# Patient Record
Sex: FEMALE | Race: BLACK OR AFRICAN AMERICAN | NOT HISPANIC OR LATINO | Employment: FULL TIME | URBAN - METROPOLITAN AREA
[De-identification: names, ages, dates, MRNs, and addresses within clinical notes are randomized per-mention and may not be internally consistent; named-entity substitution may affect disease eponyms.]

---

## 2023-10-06 PROBLEM — F12.90 MARIJUANA USE: Status: ACTIVE | Noted: 2023-10-06

## 2023-10-06 PROBLEM — O26.00 EXCESS WEIGHT GAIN IN PREGNANCY (HHS-HCC): Status: ACTIVE | Noted: 2023-10-06

## 2023-10-06 PROBLEM — O35.BXX0 ABNORMAL FETAL ECHOCARDIOGRAM AFFECTING ANTEPARTUM CARE OF MOTHER (HHS-HCC): Status: ACTIVE | Noted: 2023-10-06

## 2023-10-06 PROBLEM — O99.013 ANEMIA DURING PREGNANCY IN THIRD TRIMESTER (HHS-HCC): Status: ACTIVE | Noted: 2023-10-06

## 2023-10-06 PROBLEM — O09.30 LATE PRENATAL CARE (HHS-HCC): Status: ACTIVE | Noted: 2023-10-06

## 2023-10-06 PROBLEM — H52.10 ERROR, REFRACTIVE, MYOPIA: Status: ACTIVE | Noted: 2023-10-06

## 2023-10-06 PROBLEM — O09.899 SHORT INTERVAL BETWEEN PREGNANCIES AFFECTING PREGNANCY, ANTEPARTUM (HHS-HCC): Status: ACTIVE | Noted: 2023-10-06

## 2023-10-06 RX ORDER — IBUPROFEN 600 MG/1
TABLET ORAL
COMMUNITY
Start: 2022-09-29 | End: 2024-05-20

## 2023-10-06 RX ORDER — DOCUSATE SODIUM 100 MG/1
CAPSULE, LIQUID FILLED ORAL
COMMUNITY
Start: 2022-07-20 | End: 2023-10-09 | Stop reason: ALTCHOICE

## 2023-10-06 RX ORDER — FERROUS SULFATE 325(65) MG
1 TABLET ORAL DAILY
COMMUNITY
Start: 2022-07-20 | End: 2023-10-09 | Stop reason: SDUPTHER

## 2023-10-06 RX ORDER — PNV NO.95/FERROUS FUM/FOLIC AC 28MG-0.8MG
1 TABLET ORAL DAILY
COMMUNITY
Start: 2022-04-20 | End: 2024-02-27 | Stop reason: SDUPTHER

## 2023-10-09 ENCOUNTER — PROCEDURE VISIT (OUTPATIENT)
Dept: OBSTETRICS AND GYNECOLOGY | Facility: CLINIC | Age: 33
End: 2023-10-09
Payer: COMMERCIAL

## 2023-10-09 VITALS
HEART RATE: 62 BPM | SYSTOLIC BLOOD PRESSURE: 135 MMHG | BODY MASS INDEX: 30.04 KG/M2 | WEIGHT: 180.3 LBS | DIASTOLIC BLOOD PRESSURE: 81 MMHG | HEIGHT: 65 IN

## 2023-10-09 DIAGNOSIS — Z30.46 NEXPLANON REMOVAL: Primary | ICD-10-CM

## 2023-10-09 PROCEDURE — 11982 REMOVE DRUG IMPLANT DEVICE: CPT | Performed by: OBSTETRICS & GYNECOLOGY

## 2023-10-09 RX ORDER — PNV NO.95/FERROUS FUM/FOLIC AC 28MG-0.8MG
1 TABLET ORAL DAILY
Qty: 30 TABLET | Status: CANCELLED | OUTPATIENT
Start: 2023-10-09

## 2023-10-09 RX ORDER — FERROUS SULFATE 325(65) MG
1 TABLET ORAL DAILY
Qty: 30 TABLET | Refills: 3 | Status: SHIPPED | OUTPATIENT
Start: 2023-10-09 | End: 2024-02-27 | Stop reason: SDUPTHER

## 2023-10-09 ASSESSMENT — PAIN SCALES - GENERAL: PAINLEVEL: 0-NO PAIN

## 2023-10-10 PROBLEM — Z30.46 NEXPLANON REMOVAL: Status: ACTIVE | Noted: 2023-10-10

## 2023-10-10 NOTE — PROGRESS NOTES
Here for nexplanon removal due to unscheduled bleeding.  Discussed quick return to fertility, she expressed understanding.  Rx for Cordelia/emergency contraception sent to pharmacy.          --------------------------------------------    Lita Cedeno is a 32 y.o. female.  Requesting Nexplanon removal due to unscheduled bleeding.      Vitals:    10/09/23 1107   BP: 135/81   Pulse: 62     Gen:  NAD.  Appears comfortable.  Chest:  breathing easily.    Psych:  AAOx3.  Appropriate affect.      Insertion of Contraceptive Capsule    Date/Time: 10/10/2023 11:25 AM    Performed by: Alice Hawkins MD  Authorized by: Alice Hawkins MD    Consent:     Consent obtained:  Written    Consent given by:  Patient    Procedural risks discussed:  Bleeding, infection and damage to other organs    Patient questions answered: yes      Patient agrees, verbalizes understanding, and wants to proceed: yes      Educational handouts given: yes      Instructions and paperwork completed: yes    Pre-procedure:     Prepped with: povidone-iodine      Local anesthetic:  Lidocaine 1%  Procedure:     Procedure:  Removal    Small stab incision was made in arm: yes      Left/right:  Left    Site was closed with steri-strips and pressure bandage applied: yes    Comments:      Nexplanon removed intact.

## 2024-02-27 ENCOUNTER — INITIAL PRENATAL (OUTPATIENT)
Dept: OBSTETRICS AND GYNECOLOGY | Facility: CLINIC | Age: 34
End: 2024-02-27
Payer: COMMERCIAL

## 2024-02-27 ENCOUNTER — LAB (OUTPATIENT)
Dept: LAB | Facility: LAB | Age: 34
End: 2024-02-27
Payer: COMMERCIAL

## 2024-02-27 VITALS — WEIGHT: 169.5 LBS | DIASTOLIC BLOOD PRESSURE: 83 MMHG | BODY MASS INDEX: 28.21 KG/M2 | SYSTOLIC BLOOD PRESSURE: 130 MMHG

## 2024-02-27 DIAGNOSIS — O99.011 ANEMIA DURING PREGNANCY IN FIRST TRIMESTER (HHS-HCC): ICD-10-CM

## 2024-02-27 DIAGNOSIS — Z3A.09 9 WEEKS GESTATION OF PREGNANCY (HHS-HCC): ICD-10-CM

## 2024-02-27 DIAGNOSIS — Z86.79 HISTORY OF HEART MURMUR IN CHILDHOOD: ICD-10-CM

## 2024-02-27 DIAGNOSIS — O09.91 HIGH-RISK PREGNANCY IN FIRST TRIMESTER (HHS-HCC): ICD-10-CM

## 2024-02-27 DIAGNOSIS — O10.919 CHRONIC HYPERTENSION IN PREGNANCY (HHS-HCC): ICD-10-CM

## 2024-02-27 DIAGNOSIS — Z3A.09 9 WEEKS GESTATION OF PREGNANCY (HHS-HCC): Primary | ICD-10-CM

## 2024-02-27 DIAGNOSIS — O09.41 HIGH RISK MULTIGRAVIDA IN FIRST TRIMESTER (HHS-HCC): ICD-10-CM

## 2024-02-27 PROBLEM — O09.899 SHORT INTERVAL BETWEEN PREGNANCIES AFFECTING PREGNANCY, ANTEPARTUM (HHS-HCC): Status: RESOLVED | Noted: 2023-10-06 | Resolved: 2024-02-27

## 2024-02-27 PROBLEM — O99.013 ANEMIA DURING PREGNANCY IN THIRD TRIMESTER (HHS-HCC): Status: RESOLVED | Noted: 2023-10-06 | Resolved: 2024-02-27

## 2024-02-27 PROBLEM — Z34.91 ENCOUNTER FOR SUPERVISION OF NORMAL PREGNANCY IN FIRST TRIMESTER (HHS-HCC): Status: ACTIVE | Noted: 2024-02-27

## 2024-02-27 PROBLEM — O09.30 LATE PRENATAL CARE (HHS-HCC): Status: RESOLVED | Noted: 2023-10-06 | Resolved: 2024-02-27

## 2024-02-27 PROBLEM — H52.10 ERROR, REFRACTIVE, MYOPIA: Status: RESOLVED | Noted: 2023-10-06 | Resolved: 2024-02-27

## 2024-02-27 PROBLEM — O26.00 EXCESS WEIGHT GAIN IN PREGNANCY (HHS-HCC): Status: RESOLVED | Noted: 2023-10-06 | Resolved: 2024-02-27

## 2024-02-27 PROBLEM — F12.90 MARIJUANA USE: Status: RESOLVED | Noted: 2023-10-06 | Resolved: 2024-02-27

## 2024-02-27 PROBLEM — O35.BXX0 ABNORMAL FETAL ECHOCARDIOGRAM AFFECTING ANTEPARTUM CARE OF MOTHER (HHS-HCC): Status: RESOLVED | Noted: 2023-10-06 | Resolved: 2024-02-27

## 2024-02-27 PROBLEM — Z30.46 NEXPLANON REMOVAL: Status: RESOLVED | Noted: 2023-10-10 | Resolved: 2024-02-27

## 2024-02-27 LAB
ABO GROUP (TYPE) IN BLOOD: NORMAL
ALBUMIN SERPL BCP-MCNC: 4.3 G/DL (ref 3.4–5)
ALP SERPL-CCNC: 23 U/L (ref 33–110)
ALT SERPL W P-5'-P-CCNC: 12 U/L (ref 7–45)
ANION GAP SERPL CALC-SCNC: 12 MMOL/L (ref 10–20)
ANTIBODY SCREEN: NORMAL
AST SERPL W P-5'-P-CCNC: 11 U/L (ref 9–39)
BILIRUB SERPL-MCNC: 0.3 MG/DL (ref 0–1.2)
BUN SERPL-MCNC: 10 MG/DL (ref 6–23)
CALCIUM SERPL-MCNC: 9.4 MG/DL (ref 8.6–10.6)
CHLORIDE SERPL-SCNC: 106 MMOL/L (ref 98–107)
CO2 SERPL-SCNC: 24 MMOL/L (ref 21–32)
CREAT SERPL-MCNC: 0.53 MG/DL (ref 0.5–1.05)
EGFRCR SERPLBLD CKD-EPI 2021: >90 ML/MIN/1.73M*2
ERYTHROCYTE [DISTWIDTH] IN BLOOD BY AUTOMATED COUNT: 12.1 % (ref 11.5–14.5)
GLUCOSE SERPL-MCNC: 83 MG/DL (ref 74–99)
HCT VFR BLD AUTO: 33.8 % (ref 36–46)
HGB BLD-MCNC: 11.6 G/DL (ref 12–16)
HIV 1+2 AB+HIV1 P24 AG SERPL QL IA: NONREACTIVE
MCH RBC QN AUTO: 30.8 PG (ref 26–34)
MCHC RBC AUTO-ENTMCNC: 34.3 G/DL (ref 32–36)
MCV RBC AUTO: 90 FL (ref 80–100)
NRBC BLD-RTO: 0 /100 WBCS (ref 0–0)
PLATELET # BLD AUTO: 240 X10*3/UL (ref 150–450)
POTASSIUM SERPL-SCNC: 3.8 MMOL/L (ref 3.5–5.3)
PROT SERPL-MCNC: 6.9 G/DL (ref 6.4–8.2)
RBC # BLD AUTO: 3.77 X10*6/UL (ref 4–5.2)
RH FACTOR (ANTIGEN D): NORMAL
SODIUM SERPL-SCNC: 138 MMOL/L (ref 136–145)
WBC # BLD AUTO: 4 X10*3/UL (ref 4.4–11.3)

## 2024-02-27 PROCEDURE — 86900 BLOOD TYPING SEROLOGIC ABO: CPT

## 2024-02-27 PROCEDURE — 87340 HEPATITIS B SURFACE AG IA: CPT

## 2024-02-27 PROCEDURE — 80053 COMPREHEN METABOLIC PANEL: CPT

## 2024-02-27 PROCEDURE — 81220 CFTR GENE COM VARIANTS: CPT

## 2024-02-27 PROCEDURE — 99215 OFFICE O/P EST HI 40 MIN: CPT | Performed by: STUDENT IN AN ORGANIZED HEALTH CARE EDUCATION/TRAINING PROGRAM

## 2024-02-27 PROCEDURE — 99215 OFFICE O/P EST HI 40 MIN: CPT | Mod: GC | Performed by: STUDENT IN AN ORGANIZED HEALTH CARE EDUCATION/TRAINING PROGRAM

## 2024-02-27 PROCEDURE — 87389 HIV-1 AG W/HIV-1&-2 AB AG IA: CPT

## 2024-02-27 PROCEDURE — 86850 RBC ANTIBODY SCREEN: CPT

## 2024-02-27 PROCEDURE — 85027 COMPLETE CBC AUTOMATED: CPT

## 2024-02-27 PROCEDURE — 86317 IMMUNOASSAY INFECTIOUS AGENT: CPT

## 2024-02-27 PROCEDURE — 87086 URINE CULTURE/COLONY COUNT: CPT

## 2024-02-27 PROCEDURE — 81329 SMN1 GENE DOS/DELETION ALYS: CPT

## 2024-02-27 PROCEDURE — 87800 DETECT AGNT MULT DNA DIREC: CPT

## 2024-02-27 PROCEDURE — 84156 ASSAY OF PROTEIN URINE: CPT

## 2024-02-27 PROCEDURE — 82570 ASSAY OF URINE CREATININE: CPT

## 2024-02-27 PROCEDURE — G0452 MOLECULAR PATHOLOGY INTERPR: HCPCS | Performed by: OBSTETRICS & GYNECOLOGY

## 2024-02-27 PROCEDURE — 86803 HEPATITIS C AB TEST: CPT

## 2024-02-27 PROCEDURE — 86901 BLOOD TYPING SEROLOGIC RH(D): CPT

## 2024-02-27 PROCEDURE — 86780 TREPONEMA PALLIDUM: CPT

## 2024-02-27 PROCEDURE — 36415 COLL VENOUS BLD VENIPUNCTURE: CPT

## 2024-02-27 RX ORDER — ASPIRIN 81 MG/1
81 TABLET ORAL DAILY
Qty: 30 TABLET | Refills: 11 | Status: SHIPPED | OUTPATIENT
Start: 2024-02-27 | End: 2025-02-26

## 2024-02-27 RX ORDER — FERROUS SULFATE 325(65) MG
1 TABLET ORAL DAILY
Qty: 30 TABLET | Refills: 3 | Status: SHIPPED | OUTPATIENT
Start: 2024-02-27

## 2024-02-27 RX ORDER — ACETAMINOPHEN 500 MG
1 TABLET ORAL DAILY
Qty: 1 KIT | Refills: 0 | Status: SHIPPED | OUTPATIENT
Start: 2024-02-27

## 2024-02-27 RX ORDER — PNV NO.95/FERROUS FUM/FOLIC AC 28MG-0.8MG
1 TABLET ORAL DAILY
Qty: 30 TABLET | Refills: 11 | Status: SHIPPED | OUTPATIENT
Start: 2024-02-27

## 2024-02-27 ASSESSMENT — ENCOUNTER SYMPTOMS
RESPIRATORY NEGATIVE: 0
CONSTITUTIONAL NEGATIVE: 0
CARDIOVASCULAR NEGATIVE: 0
NEUROLOGICAL NEGATIVE: 0
HEMATOLOGIC/LYMPHATIC NEGATIVE: 0
ALLERGIC/IMMUNOLOGIC NEGATIVE: 0
GASTROINTESTINAL NEGATIVE: 0
EYES NEGATIVE: 0
PSYCHIATRIC NEGATIVE: 0
MUSCULOSKELETAL NEGATIVE: 0
ENDOCRINE NEGATIVE: 0

## 2024-02-27 ASSESSMENT — EDINBURGH POSTNATAL DEPRESSION SCALE (EPDS)
I HAVE BEEN SO UNHAPPY THAT I HAVE BEEN CRYING: ONLY OCCASIONALLY
TOTAL SCORE: 9
I HAVE BLAMED MYSELF UNNECESSARILY WHEN THINGS WENT WRONG: NOT VERY OFTEN
THINGS HAVE BEEN GETTING ON TOP OF ME: NO, MOST OF THE TIME I HAVE COPED QUITE WELL
I HAVE BEEN ABLE TO LAUGH AND SEE THE FUNNY SIDE OF THINGS: AS MUCH AS I ALWAYS COULD
I HAVE FELT SCARED OR PANICKY FOR NO GOOD REASON: NO, NOT MUCH
I HAVE BEEN SO UNHAPPY THAT I HAVE HAD DIFFICULTY SLEEPING: NOT VERY OFTEN
I HAVE LOOKED FORWARD WITH ENJOYMENT TO THINGS: RATHER LESS THAN I USED TO
I HAVE BEEN ANXIOUS OR WORRIED FOR NO GOOD REASON: YES, SOMETIMES
I HAVE FELT SAD OR MISERABLE: NOT VERY OFTEN
THE THOUGHT OF HARMING MYSELF HAS OCCURRED TO ME: NEVER

## 2024-02-27 NOTE — PROGRESS NOTES
Subjective   Patient ID [id number]   Lita Cedeno is a 33 y.o.  at 9w6d with a working estimated date of delivery of 2024, by Last Menstrual Period who presents for an initial prenatal visit. This pregnancy was unexpected, patient desires to continue it. Endorsing lightheadedness, feels similar to previous anemia symptoms, not currently on PO iron. Drinking 3 bottles of water per day.     Her pregnancy is complicated by:  - Maternal history of heart murmur in childhood, seen by cardiology in 2019, per chart review of previous OB notes, no further work up needed   - Anemia  - Chronic hypertension, not on medication     OB History    Para Term  AB Living   5 4 4   0 4   SAB IAB Ectopic Multiple Live Births     0     4      # Outcome Date GA Lbr Reginald/2nd Weight Sex Delivery Anes PTL Lv   5 Current            4 Term      Vag-Spont  N PEGGY   3 Term 2019 39w4d    Vag-Spont  N PEGGY   2 Term 07/15/17 39w4d  2.92 kg M Vag-Spont  N PEGGY   1 Term 11/03/15   3.799 kg M Vag-Spont   PEGGY     [place]  Depression Scale Total: 9    Objective   Physical Exam  Weight: 76.9 kg (169 lb 8 oz)  Expected Total Weight Gain: Could not be calculated   Pregravid BMI: Could not be calculated  BP: 130/83      Physical Exam  Constitutional:       Appearance: Normal appearance.   HENT:      Head: Normocephalic and atraumatic.      Nose: Nose normal.   Eyes:      Extraocular Movements: Extraocular movements intact.   Cardiovascular:      Rate and Rhythm: Normal rate.   Pulmonary:      Effort: Pulmonary effort is normal.   Musculoskeletal:         General: Normal range of motion.      Cervical back: Normal range of motion.   Neurological:      General: No focal deficit present.      Mental Status: She is alert.   Skin:     General: Skin is warm and dry.   Psychiatric:         Behavior: Behavior normal.       Assessment/Plan     Problem List Items Addressed This Visit       9 weeks gestation of pregnancy -  Primary    Relevant Medications    prenatal vitamin, iron-folic, (Prenatal) 27 mg iron-800 mcg folic acid tablet    Other Relevant Orders    US MAC OB imaging order    CBC Anemia Panel With Reflex,Pregnancy    Type And Screen    Hepatitis B surface antigen    Hepatitis C antibody    Rubella Antibody, IgG    Syphilis Screen with Reflex    HIV 1/2 Antigen/Antibody Screen with Reflex to Confirmation    Urine Culture    C. Trachomatis / N. Gonorrhoeae, Amplified Detection    CBC    Comprehensive Metabolic Panel    Protein, Urine Random    Myriad Prequel Prenatal Screen    SMA Carrier Screening    Cystic Fibrosis Carrier Screening    Follow Up In Obstetrics    Anemia during pregnancy in first trimester    Overview     Prescribed PO iron         Relevant Medications    ferrous sulfate, 325 mg ferrous sulfate, tablet    Chronic hypertension in pregnancy    Overview     - BP at first prenatal visit 130/83  - Not on meds   - Baseline HELLP labs ordered   - Started on bASA ppx          Relevant Medications    aspirin 81 mg EC tablet    blood pressure monitor (Blood Pressure Kit) kit    Encounter for supervision of normal pregnancy in first trimester    Overview     Dating:   [x] Initial BMI: 28  [x] Prenatal Labs: ordered   [x] Genetic Screening: ordered Myriad, CF and SMA screening 2/27  [x] Baby ASA: ordered  [] Anatomy US:  [] 1hr GCT at 24-28wks:  [] Tdap (27-36wks):  [] Flu Shot:  [] COVID vaccine:   [] Rhogam (if Rh neg):   [] GBS at 36 wks:  [] Breastfeeding  [] Postpartum Birth control method:   [] 39 weeks discussion of IOL vs. Expectant management:  [] Mode of delivery:           History of heart murmur in childhood    Overview     Per documentation from 2019 pregnancy, seen by cardiology and required no further follow up   Can consider fetal echo          Other Visit Diagnoses       High-risk pregnancy in first trimester              - Prenatal labs, genetic screening and CF and SMA carrier screening ordered as  above  - Daily prenatal vitamins prescribed  - Menses irregular, dating US ordered     Follow up in 4 weeks for return OB visit    Seen and discussed with Dr. Susy Mckeon MD

## 2024-02-28 LAB
BACTERIA UR CULT: NO GROWTH
C TRACH RRNA SPEC QL NAA+PROBE: NEGATIVE
CREAT UR-MCNC: 200.3 MG/DL (ref 20–320)
HBV SURFACE AG SERPL QL IA: NONREACTIVE
HCV AB SER QL: NONREACTIVE
N GONORRHOEA DNA SPEC QL PROBE+SIG AMP: NEGATIVE
PROT UR-ACNC: 30 MG/DL (ref 5–24)
PROT/CREAT UR: 0.15 MG/MG CREAT (ref 0–0.17)
REFLEX ADDED, ANEMIA PANEL: NORMAL
RUBV IGG SERPL IA-ACNC: 4.2 IA
RUBV IGG SERPL QL IA: POSITIVE
TREPONEMA PALLIDUM IGG+IGM AB [PRESENCE] IN SERUM OR PLASMA BY IMMUNOASSAY: NONREACTIVE

## 2024-03-08 LAB
ELECTRONICALLY SIGNED BY: NORMAL
SMA RESULT: NORMAL

## 2024-03-15 LAB
CFTR MUT ANL BLD/T: NORMAL
ELECTRONICALLY SIGNED BY: NORMAL

## 2024-04-08 ENCOUNTER — PHARMACY VISIT (OUTPATIENT)
Dept: PHARMACY | Facility: CLINIC | Age: 34
End: 2024-04-08
Payer: MEDICAID

## 2024-04-08 ENCOUNTER — LAB (OUTPATIENT)
Dept: LAB | Facility: LAB | Age: 34
End: 2024-04-08
Payer: COMMERCIAL

## 2024-04-08 ENCOUNTER — ROUTINE PRENATAL (OUTPATIENT)
Dept: OBSTETRICS AND GYNECOLOGY | Facility: CLINIC | Age: 34
End: 2024-04-08
Payer: COMMERCIAL

## 2024-04-08 VITALS — BODY MASS INDEX: 31.18 KG/M2 | DIASTOLIC BLOOD PRESSURE: 70 MMHG | SYSTOLIC BLOOD PRESSURE: 114 MMHG | WEIGHT: 187.4 LBS

## 2024-04-08 DIAGNOSIS — R51.9 NONINTRACTABLE HEADACHE, UNSPECIFIED CHRONICITY PATTERN, UNSPECIFIED HEADACHE TYPE: ICD-10-CM

## 2024-04-08 DIAGNOSIS — Z34.02 ENCOUNTER FOR SUPERVISION OF NORMAL FIRST PREGNANCY IN SECOND TRIMESTER (HHS-HCC): Primary | ICD-10-CM

## 2024-04-08 DIAGNOSIS — O10.919 CHRONIC HYPERTENSION AFFECTING PREGNANCY (HHS-HCC): ICD-10-CM

## 2024-04-08 DIAGNOSIS — R35.0 URINARY FREQUENCY: ICD-10-CM

## 2024-04-08 DIAGNOSIS — R42 DIZZINESS: ICD-10-CM

## 2024-04-08 DIAGNOSIS — Z34.82 PRENATAL CARE, SUBSEQUENT PREGNANCY, SECOND TRIMESTER (HHS-HCC): ICD-10-CM

## 2024-04-08 PROBLEM — Z34.92 ENCOUNTER FOR SUPERVISION OF NORMAL PREGNANCY IN SECOND TRIMESTER (HHS-HCC): Status: ACTIVE | Noted: 2024-02-27

## 2024-04-08 PROBLEM — Z3A.09 9 WEEKS GESTATION OF PREGNANCY (HHS-HCC): Status: RESOLVED | Noted: 2024-02-27 | Resolved: 2024-04-08

## 2024-04-08 LAB
ERYTHROCYTE [DISTWIDTH] IN BLOOD BY AUTOMATED COUNT: 12.7 % (ref 11.5–14.5)
FERRITIN SERPL-MCNC: 46 NG/ML
FOLATE SERPL-MCNC: >24 NG/ML
HCT VFR BLD AUTO: 31.2 % (ref 36–46)
HGB BLD-MCNC: 10.4 G/DL (ref 12–16)
IRON SATN MFR SERPL: 42 %
IRON SERPL-MCNC: 120 UG/DL
MCH RBC QN AUTO: 31.2 PG (ref 26–34)
MCHC RBC AUTO-ENTMCNC: 33.3 G/DL (ref 32–36)
MCV RBC AUTO: 94 FL (ref 80–100)
NRBC BLD-RTO: 0 /100 WBCS (ref 0–0)
PLATELET # BLD AUTO: 241 X10*3/UL (ref 150–450)
RBC # BLD AUTO: 3.33 X10*6/UL (ref 4–5.2)
REFLEX ADDED, ANEMIA PANEL: NORMAL
TIBC SERPL-MCNC: 285 UG/DL
UIBC SERPL-MCNC: 165 UG/DL
VIT B12 SERPL-MCNC: 469 PG/ML
WBC # BLD AUTO: 5.3 X10*3/UL (ref 4.4–11.3)

## 2024-04-08 PROCEDURE — 99214 OFFICE O/P EST MOD 30 MIN: CPT | Mod: GC,TH | Performed by: STUDENT IN AN ORGANIZED HEALTH CARE EDUCATION/TRAINING PROGRAM

## 2024-04-08 PROCEDURE — 99214 OFFICE O/P EST MOD 30 MIN: CPT | Performed by: STUDENT IN AN ORGANIZED HEALTH CARE EDUCATION/TRAINING PROGRAM

## 2024-04-08 PROCEDURE — 82607 VITAMIN B-12: CPT

## 2024-04-08 PROCEDURE — RXMED WILLOW AMBULATORY MEDICATION CHARGE

## 2024-04-08 PROCEDURE — 83550 IRON BINDING TEST: CPT

## 2024-04-08 PROCEDURE — 85027 COMPLETE CBC AUTOMATED: CPT

## 2024-04-08 PROCEDURE — 82728 ASSAY OF FERRITIN: CPT

## 2024-04-08 PROCEDURE — 36415 COLL VENOUS BLD VENIPUNCTURE: CPT

## 2024-04-08 PROCEDURE — 82746 ASSAY OF FOLIC ACID SERUM: CPT

## 2024-04-08 PROCEDURE — 87086 URINE CULTURE/COLONY COUNT: CPT | Performed by: STUDENT IN AN ORGANIZED HEALTH CARE EDUCATION/TRAINING PROGRAM

## 2024-04-08 RX ORDER — ACETAMINOPHEN 500 MG
1000 TABLET ORAL EVERY 6 HOURS PRN
Qty: 30 TABLET | Refills: 0 | Status: SHIPPED | OUTPATIENT
Start: 2024-04-08 | End: 2024-04-08 | Stop reason: SDUPTHER

## 2024-04-08 RX ORDER — ACETAMINOPHEN 500 MG
1 TABLET ORAL DAILY
Qty: 1 EACH | Refills: 0 | Status: SHIPPED | OUTPATIENT
Start: 2024-04-08

## 2024-04-08 RX ORDER — ACETAMINOPHEN 500 MG
1000 TABLET ORAL EVERY 6 HOURS PRN
Qty: 30 TABLET | Refills: 0 | Status: SHIPPED | OUTPATIENT
Start: 2024-04-08 | End: 2024-04-18

## 2024-04-08 RX ORDER — ACETAMINOPHEN 500 MG
1 TABLET ORAL DAILY
Qty: 1 KIT | Refills: 0 | Status: SHIPPED | OUTPATIENT
Start: 2024-04-08 | End: 2024-04-08

## 2024-04-08 ASSESSMENT — ENCOUNTER SYMPTOMS
HEMATOLOGIC/LYMPHATIC NEGATIVE: 0
ALLERGIC/IMMUNOLOGIC NEGATIVE: 0
GASTROINTESTINAL NEGATIVE: 0
RESPIRATORY NEGATIVE: 0
EYES NEGATIVE: 0
CONSTITUTIONAL NEGATIVE: 0
PSYCHIATRIC NEGATIVE: 0
ENDOCRINE NEGATIVE: 0
MUSCULOSKELETAL NEGATIVE: 0
NEUROLOGICAL NEGATIVE: 0
CARDIOVASCULAR NEGATIVE: 0

## 2024-04-08 NOTE — PROGRESS NOTES
Ob Follow-up  2024      SUBJECTIVE    HPI: Lita Cedeno is a 33 y.o.  at 15w5d here for RPNV.  She has no contractions, bleeding, or LOF. Reports normal fetal movement.     Patient reports some episodes of light headedness/dizziness with headaches. No aura, vision changes, hearing changes, n/v. Happens about twice a week. Taking iron and prenatal vitamin daily.     Also endorses increased urinary frequency. Denies dysuria, foul smelling urine, color change of urine.     Otherwise feels well. Thinks that some of the symptoms she is having occurred later in her other pregnancies. These include back pain, leg pain, and general abdominal discomfort.     OBJECTIVE  Visit Vitals  /70 Comment: Pluse-71   Wt 85 kg (187 lb 6.4 oz)   LMP 2023 (Within Weeks)   BMI 31.18 kg/m²   OB Status Pregnant   Smoking Status Never   BSA 1.97 m²      FHT: visually normal on BSUS     GENERAL: well developed, well nourished female in no acute distress  HEENT: clear sclera  NECK: supple  LUNGS: breathing comfortably on room air  HEART: warm and well-perfused  SKIN: no rashes or lesions  NEUROLOGICAL: grossly intact bilaterally  PSYCHOLOGICAL: appropriate affect     ASSESSMENT & PLAN  Lita Cedeno is a 33 y.o.  at 15w5d here for the following concerns we addressed today:    Problem List Items Addressed This Visit       Chronic hypertension affecting pregnancy    Overview     - BP at first prenatal visit 130/83  - Not on meds   - Baseline HELLP labs  WNL   - Started on daily bASA ppx          Relevant Medications    blood pressure test kit-large kit    Dizziness    Overview     - Symptoms may be due to dehydration in pregnancy   - Encouraged PO hydration and compression stockings ordered   - Given history of murmur (none on  on exam) and episodes of lightheadedness, discussed echo if symptoms do not resolve with supportive measures as above   - CBC ordered to evaluate for anemia          Relevant  Orders    CBC Anemia Panel With Reflex, Pregnancy (Completed)    Compression Stockings 15-20 mmHg    Transthoracic Echo (TTE) Complete    Encounter for supervision of normal pregnancy in second trimester - Primary    Overview     Dating:   [x] Initial BMI: 28  [x] Prenatal Labs: WNL   [] Genetic Screening: ordered Myriad, CF and SMA screening 2/27 and WNL;  prequel redrawn and pending 4/8   [x] Baby ASA: ordered  [] Anatomy US:  [] 1hr GCT at 24-28wks:  [] Tdap (27-36wks):  [] Flu Shot:  [] COVID vaccine:   [x] Rhogam (if Rh neg): Rh positive, not indicated   [] GBS at 36 wks:  [] Breastfeeding  [] Postpartum birth control method: considering tubal   [] 39 weeks discussion of IOL vs. Expectant management:  [] Mode of delivery:           Relevant Orders    US MAC OB imaging order    Nonintractable headache    Relevant Medications    acetaminophen (Tylenol Extra Strength) 500 mg tablet    Urinary frequency    Relevant Orders    Urine Culture     Jayme Cronin, MS3    RTC in 4 weeks    PGY4 addendum: Agree with MS3 note as above, edits made within text.     Seen and discussed with Dr. Mary Mckeon MD

## 2024-04-08 NOTE — PROGRESS NOTES
I saw and evaluated the patient. I personally obtained the key and critical portions of the history and physical exam or was physically present for key and critical portions performed by the resident/fellow. I reviewed the resident/fellow's documentation and discussed the patient with the resident/fellow. I agree with the resident/fellow's medical decision making as documented in the note.    Maureen Hernandez MD

## 2024-04-09 LAB — BACTERIA UR CULT: NORMAL

## 2024-04-10 ENCOUNTER — HOSPITAL ENCOUNTER (OUTPATIENT)
Dept: RADIOLOGY | Facility: CLINIC | Age: 34
Discharge: HOME | End: 2024-04-10
Payer: COMMERCIAL

## 2024-04-10 DIAGNOSIS — Z34.02 ENCOUNTER FOR SUPERVISION OF NORMAL FIRST PREGNANCY IN SECOND TRIMESTER (HHS-HCC): ICD-10-CM

## 2024-04-10 PROCEDURE — 76811 OB US DETAILED SNGL FETUS: CPT

## 2024-04-10 PROCEDURE — 76805 OB US >/= 14 WKS SNGL FETUS: CPT | Performed by: OBSTETRICS & GYNECOLOGY

## 2024-04-16 ENCOUNTER — DOCUMENTATION (OUTPATIENT)
Dept: OBSTETRICS AND GYNECOLOGY | Facility: CLINIC | Age: 34
End: 2024-04-16
Payer: COMMERCIAL

## 2024-04-17 LAB — SCAN RESULT: NORMAL

## 2024-05-06 ENCOUNTER — APPOINTMENT (OUTPATIENT)
Dept: OBSTETRICS AND GYNECOLOGY | Facility: CLINIC | Age: 34
End: 2024-05-06
Payer: COMMERCIAL

## 2024-05-06 ENCOUNTER — APPOINTMENT (OUTPATIENT)
Dept: RADIOLOGY | Facility: CLINIC | Age: 34
End: 2024-05-06
Payer: COMMERCIAL

## 2024-05-20 ENCOUNTER — HOSPITAL ENCOUNTER (OUTPATIENT)
Dept: RADIOLOGY | Facility: CLINIC | Age: 34
Discharge: HOME | End: 2024-05-20
Payer: COMMERCIAL

## 2024-05-20 ENCOUNTER — ROUTINE PRENATAL (OUTPATIENT)
Dept: OBSTETRICS AND GYNECOLOGY | Facility: CLINIC | Age: 34
End: 2024-05-20
Payer: COMMERCIAL

## 2024-05-20 VITALS — SYSTOLIC BLOOD PRESSURE: 120 MMHG | DIASTOLIC BLOOD PRESSURE: 69 MMHG | BODY MASS INDEX: 33.22 KG/M2 | WEIGHT: 199.6 LBS

## 2024-05-20 DIAGNOSIS — Z34.02 ENCOUNTER FOR SUPERVISION OF NORMAL FIRST PREGNANCY IN SECOND TRIMESTER (HHS-HCC): ICD-10-CM

## 2024-05-20 DIAGNOSIS — R42 DIZZINESS: ICD-10-CM

## 2024-05-20 DIAGNOSIS — O10.912 CHRONIC HYPERTENSION COMPLICATING OR REASON FOR CARE DURING PREGNANCY, SECOND TRIMESTER (HHS-HCC): ICD-10-CM

## 2024-05-20 DIAGNOSIS — O99.011 ANEMIA DURING PREGNANCY IN FIRST TRIMESTER (HHS-HCC): ICD-10-CM

## 2024-05-20 DIAGNOSIS — Z03.74 FETAL GROWTH PROBLEM SUSPECTED BUT NOT FOUND: ICD-10-CM

## 2024-05-20 DIAGNOSIS — Z34.82 ENCOUNTER FOR SUPERVISION OF OTHER NORMAL PREGNANCY IN SECOND TRIMESTER (HHS-HCC): ICD-10-CM

## 2024-05-20 DIAGNOSIS — O10.919 CHRONIC HYPERTENSION AFFECTING PREGNANCY (HHS-HCC): ICD-10-CM

## 2024-05-20 DIAGNOSIS — Z86.79 HISTORY OF HEART MURMUR IN CHILDHOOD: Primary | ICD-10-CM

## 2024-05-20 PROCEDURE — 76816 OB US FOLLOW-UP PER FETUS: CPT | Performed by: OBSTETRICS & GYNECOLOGY

## 2024-05-20 PROCEDURE — 99213 OFFICE O/P EST LOW 20 MIN: CPT

## 2024-05-20 PROCEDURE — 99213 OFFICE O/P EST LOW 20 MIN: CPT | Mod: GC,TH

## 2024-05-20 PROCEDURE — 76816 OB US FOLLOW-UP PER FETUS: CPT

## 2024-05-20 NOTE — PROGRESS NOTES
Subjective   Patient ID [id number]   Lita Cedeno is a 33 y.o.  at 25w1d by 19 week scan presenting or OB fuv. She denies vaginal bleeding, leakage of fluid, decreased fetal movements, or contractions. Has still been experiencing dizziness. Has not received compression stockings yet.     Objective   Physical Exam  Weight: 90.5 kg (199 lb 9.6 oz)  BP: 120/69 (92)  Fundal height: 25 cm   Fetal heart rate: 140 bpm    Assessment/Plan   Problem List Items Addressed This Visit             ICD-10-CM    Encounter for supervision of normal pregnancy in second trimester ([facility]) Z34.92    Chronic hypertension affecting pregnancy ([facility]) O10.919     - Currently not taking blood pressure. Encouraged to start taking blood pressure at least once a week  - Repeat growth ultrasound scheduled today given late dating ultrasound. To continue serial growth scans          Anemia during pregnancy in first trimester ([facility]) O99.011     - Taking PO iron maybe two times a week, encouraged to start taking at least every other day   - For repeat CBC at next visit with other second trimester labs          Relevant Orders    CBC    History of heart murmur in childhood - Primary Z86.79     - TTE ordered, order req provided to patient again today          Dizziness R42     - RN staff notified to re order compression stockings  - Encouraged elevation of lower extremities and to avoid standing for long periods of time without resting            RTC in 4 weeks   D/w Dr. Mary Aldana MD, PGY-2

## 2024-05-20 NOTE — ASSESSMENT & PLAN NOTE
- RN staff notified to re order compression stockings  - Encouraged elevation of lower extremities and to avoid standing for long periods of time without resting

## 2024-05-20 NOTE — ASSESSMENT & PLAN NOTE
- Taking PO iron maybe two times a week, encouraged to start taking at least every other day   - For repeat CBC at next visit with other second trimester labs

## 2024-05-20 NOTE — ASSESSMENT & PLAN NOTE
- Currently not taking blood pressure. Encouraged to start taking blood pressure at least once a week  - Repeat growth ultrasound scheduled today given late dating ultrasound. To continue serial growth scans

## 2024-05-26 NOTE — PROGRESS NOTES
I reviewed the resident/fellow's documentation and discussed the patient with the resident/fellow. I agree with the resident/fellow's medical decision making as documented in the note.     Maureen Hernandez MD

## 2024-06-21 ENCOUNTER — LAB (OUTPATIENT)
Dept: LAB | Facility: LAB | Age: 34
End: 2024-06-21
Payer: COMMERCIAL

## 2024-06-21 ENCOUNTER — ROUTINE PRENATAL (OUTPATIENT)
Dept: OBSTETRICS AND GYNECOLOGY | Facility: CLINIC | Age: 34
End: 2024-06-21
Payer: COMMERCIAL

## 2024-06-21 VITALS — WEIGHT: 208.8 LBS | SYSTOLIC BLOOD PRESSURE: 105 MMHG | DIASTOLIC BLOOD PRESSURE: 60 MMHG | BODY MASS INDEX: 34.75 KG/M2

## 2024-06-21 DIAGNOSIS — O10.919 CHRONIC HYPERTENSION AFFECTING PREGNANCY (HHS-HCC): ICD-10-CM

## 2024-06-21 DIAGNOSIS — O99.011 ANEMIA DURING PREGNANCY IN FIRST TRIMESTER (HHS-HCC): ICD-10-CM

## 2024-06-21 DIAGNOSIS — R42 DIZZINESS: ICD-10-CM

## 2024-06-21 DIAGNOSIS — Z28.21 TETANUS, DIPHTHERIA, AND ACELLULAR PERTUSSIS (TDAP) VACCINATION DECLINED: ICD-10-CM

## 2024-06-21 DIAGNOSIS — Z3A.29 29 WEEKS GESTATION OF PREGNANCY (HHS-HCC): Primary | ICD-10-CM

## 2024-06-21 DIAGNOSIS — Z34.82 ENCOUNTER FOR SUPERVISION OF OTHER NORMAL PREGNANCY IN SECOND TRIMESTER (HHS-HCC): ICD-10-CM

## 2024-06-21 DIAGNOSIS — Z30.2 REQUEST FOR STERILIZATION: ICD-10-CM

## 2024-06-21 LAB
ERYTHROCYTE [DISTWIDTH] IN BLOOD BY AUTOMATED COUNT: 12.4 % (ref 11.5–14.5)
FERRITIN SERPL-MCNC: 25 NG/ML
FOLATE SERPL-MCNC: >24 NG/ML
GLUCOSE 1H P 50 G GLC PO SERPL-MCNC: 110 MG/DL
HCT VFR BLD AUTO: 30.1 % (ref 36–46)
HGB BLD-MCNC: 10.5 G/DL (ref 12–16)
HGB RETIC QN: 36 PG (ref 28–38)
IMMATURE RETIC FRACTION: 14.7 %
IRON SATN MFR SERPL: 36 %
IRON SERPL-MCNC: 114 UG/DL
MCH RBC QN AUTO: 32.6 PG (ref 26–34)
MCHC RBC AUTO-ENTMCNC: 34.9 G/DL (ref 32–36)
MCV RBC AUTO: 94 FL (ref 80–100)
NRBC BLD-RTO: 0 /100 WBCS (ref 0–0)
PLATELET # BLD AUTO: 207 X10*3/UL (ref 150–450)
RBC # BLD AUTO: 3.22 X10*6/UL (ref 4–5.2)
REFLEX ADDED, ANEMIA PANEL: NORMAL
RETICS #: 0.07 X10*6/UL (ref 0.02–0.08)
RETICS/RBC NFR AUTO: 2.2 % (ref 0.5–2)
TIBC SERPL-MCNC: 321 UG/DL
TREPONEMA PALLIDUM IGG+IGM AB [PRESENCE] IN SERUM OR PLASMA BY IMMUNOASSAY: NONREACTIVE
UIBC SERPL-MCNC: 207 UG/DL
VIT B12 SERPL-MCNC: 391 PG/ML
WBC # BLD AUTO: 6.1 X10*3/UL (ref 4.4–11.3)

## 2024-06-21 PROCEDURE — 86780 TREPONEMA PALLIDUM: CPT

## 2024-06-21 PROCEDURE — 85027 COMPLETE CBC AUTOMATED: CPT

## 2024-06-21 PROCEDURE — 99214 OFFICE O/P EST MOD 30 MIN: CPT | Mod: GC,TH

## 2024-06-21 PROCEDURE — 82947 ASSAY GLUCOSE BLOOD QUANT: CPT

## 2024-06-21 PROCEDURE — 36415 COLL VENOUS BLD VENIPUNCTURE: CPT

## 2024-06-21 PROCEDURE — 85045 AUTOMATED RETICULOCYTE COUNT: CPT

## 2024-06-21 PROCEDURE — 83550 IRON BINDING TEST: CPT

## 2024-06-21 PROCEDURE — 82746 ASSAY OF FOLIC ACID SERUM: CPT

## 2024-06-21 PROCEDURE — 82728 ASSAY OF FERRITIN: CPT

## 2024-06-21 PROCEDURE — 82607 VITAMIN B-12: CPT

## 2024-06-21 PROCEDURE — 99214 OFFICE O/P EST MOD 30 MIN: CPT

## 2024-06-21 RX ORDER — FERROUS SULFATE 325(65) MG
1 TABLET ORAL DAILY
Qty: 90 TABLET | Refills: 1 | Status: SHIPPED | OUTPATIENT
Start: 2024-06-21 | End: 2024-12-18

## 2024-06-21 ASSESSMENT — ENCOUNTER SYMPTOMS
MUSCULOSKELETAL NEGATIVE: 0
CARDIOVASCULAR NEGATIVE: 0
CONSTITUTIONAL NEGATIVE: 0
PSYCHIATRIC NEGATIVE: 0
RESPIRATORY NEGATIVE: 0
ALLERGIC/IMMUNOLOGIC NEGATIVE: 0
HEMATOLOGIC/LYMPHATIC NEGATIVE: 0
EYES NEGATIVE: 0
ENDOCRINE NEGATIVE: 0
GASTROINTESTINAL NEGATIVE: 0
NEUROLOGICAL NEGATIVE: 0

## 2024-06-21 ASSESSMENT — PATIENT HEALTH QUESTIONNAIRE - PHQ9
1. LITTLE INTEREST OR PLEASURE IN DOING THINGS: NOT AT ALL
2. FEELING DOWN, DEPRESSED OR HOPELESS: NOT AT ALL
SUM OF ALL RESPONSES TO PHQ9 QUESTIONS 1 AND 2: 0

## 2024-06-21 NOTE — ASSESSMENT & PLAN NOTE
- Pt has BP cuff at home, but not checking blood pressures. Pt to record BP daily and bring log to next prenatal visit  - Signs and symptoms of preeclampsia reviewed  - Discussed recommendation for IOL at 39 weeks

## 2024-06-21 NOTE — ASSESSMENT & PLAN NOTE
Per documentation from 2019 pregnancy, seen by cardiology and required no further follow up   Can consider fetal echo

## 2024-06-21 NOTE — PROGRESS NOTES
Subjective   Patient ID [id number]   Lita Cedeno is a 33 y.o.  at 29w5d with a working estimated date of delivery of 2024, by Ultrasound who presents for a routine prenatal visit. She denies vaginal bleeding, leakage of fluid, decreased fetal movements, or contractions. Still having some dizziness when upon standing. Reports drinking a lot of water every day.     Her pregnancy is complicated by:  - Chronic hypertension, no meds   - Anemia during pregnancy, on PO iron     Objective   Physical Exam:   Weight: 94.7 kg (208 lb 12.8 oz)  Pregravid BMI: Could not be calculated  BP: 105/60  Fetal Heart Rate: 146 Fundal Height (cm): 30 cm             Prenatal Labs  Lab Results   Component Value Date    HGB 10.4 (L) 2024    HCT 31.2 (L) 2024    ABO A 2024    HEPBSAG Nonreactive 2024         Assessment/Plan   Problem List Items Addressed This Visit             ICD-10-CM    Encounter for supervision of normal pregnancy in second trimester ([facility]) Z34.92    Relevant Orders    Glucose, 1 Hour Screen, Pregnancy    CBC Anemia Panel With Reflex,Pregnancy    Syphilis Screen with Reflex    Chronic hypertension affecting pregnancy ([facility]) O10.919     - Pt has BP cuff at home, but not checking blood pressures. Pt to record BP daily and bring log to next prenatal visit  - Signs and symptoms of preeclampsia reviewed  - Discussed recommendation for IOL at 39 weeks          Anemia during pregnancy in first trimester ([facility]) O99.011     - Continue to take PO iron daily, refilled today  - Collected CBC with anemia panel and reticulocyte count today         Relevant Medications    ferrous sulfate, 325 mg ferrous sulfate, tablet    Other Relevant Orders    Reticulocytes    Dizziness R42     - Endorses occasional dizziness upon standing  - Encouraged PO hydration and and lower extremity elevation          29 weeks gestation of pregnancy ([facility]) - Primary Z3A.29    Request for sterilization Z30.2      - Discussed options for post partum birthcontrol. Pt considering tubal ligation after vaginal delivery vs levonorgestrel iud  - Signed federal documentation 6/21         Tetanus, diphtheria, and acellular pertussis (Tdap) vaccination declined Z28.21     - Education provided  - Pt declines Tdap vaccination at this time; plan to revisit at next prenatal visit          RTC in 2 weeks   D/w and seen by Dr. William Aldana MD

## 2024-06-21 NOTE — ASSESSMENT & PLAN NOTE
- Discussed options for post partum birthcontrol. Pt considering tubal ligation after vaginal delivery vs levonorgestrel iud  - Signed federal documentation 6/21

## 2024-06-21 NOTE — ASSESSMENT & PLAN NOTE
- Education provided  - Pt declines Tdap vaccination at this time; plan to revisit at next prenatal visit

## 2024-06-21 NOTE — ASSESSMENT & PLAN NOTE
- Continue to take PO iron daily, refilled today  - Collected CBC with anemia panel and reticulocyte count today

## 2024-06-21 NOTE — ASSESSMENT & PLAN NOTE
- Endorses occasional dizziness upon standing  - Encouraged PO hydration and and lower extremity elevation

## 2024-06-21 NOTE — PROGRESS NOTES
Subjective   Patient ID [id number]   Lita Cedeno is a 33 y.o.  at 29w5d with a working estimated date of delivery of 2024, by Ultrasound who presents for a routine prenatal visit. She denies vaginal bleeding, leakage of fluid, decreased fetal movements, or contractions. Still having some dizziness when upon standing. Reports drinking a lot of water every day.     Her pregnancy is complicated by:  - Chronic hypertension, no meds   - Anemia during pregnancy, on PO iron     Objective   Physical Exam:   Weight: 94.7 kg (208 lb 12.8 oz)  Pregravid BMI: Could not be calculated  BP: 105/60  Fetal Heart Rate: 146 Fundal Height (cm): 30 cm             Prenatal Labs  Lab Results   Component Value Date    HGB 10.4 (L) 2024    HCT 31.2 (L) 2024    ABO A 2024    HEPBSAG Nonreactive 2024         Assessment/Plan   Problem List Items Addressed This Visit             ICD-10-CM    Encounter for supervision of normal pregnancy in second trimester ([facility]) Z34.92    Relevant Orders    Glucose, 1 Hour Screen, Pregnancy    CBC Anemia Panel With Reflex,Pregnancy    Syphilis Screen with Reflex    Chronic hypertension affecting pregnancy ([facility]) O10.919     - Pt has BP cuff at home, but not checking blood pressures. Pt to record BP daily and bring log to next prenatal visit  - Signs and symptoms of preeclampsia reviewed  - Discussed recommendation for IOL at 39 weeks          Anemia during pregnancy in first trimester ([facility]) O99.011     - Continue to take PO iron daily, refilled today  - Collected CBC with anemia panel and reticulocyte count today         Relevant Medications    ferrous sulfate, 325 mg ferrous sulfate, tablet    Other Relevant Orders    Reticulocytes    Dizziness R42     - Endorses occasional dizziness upon standing  - Encouraged PO hydration and and lower extremity elevation          29 weeks gestation of pregnancy ([facility]) - Primary Z3A.29    Request for sterilization Z30.2      - Discussed options for post partum birthcontrol. Pt considering tubal ligation after vaginal delivery vs levonorgestrel iud  - Signed federal documentation 6/21         Tetanus, diphtheria, and acellular pertussis (Tdap) vaccination declined Z28.21     - Education provided  - Pt declines Tdap vaccination at this time; plan to revisit at next prenatal visit          RTC in 2 weeks   D/w and seen by Dr. William Aldana MD

## 2024-07-12 ENCOUNTER — ROUTINE PRENATAL (OUTPATIENT)
Dept: OBSTETRICS AND GYNECOLOGY | Facility: CLINIC | Age: 34
End: 2024-07-12
Payer: COMMERCIAL

## 2024-07-12 ENCOUNTER — HOSPITAL ENCOUNTER (OUTPATIENT)
Dept: RADIOLOGY | Facility: CLINIC | Age: 34
Discharge: HOME | End: 2024-07-12
Payer: COMMERCIAL

## 2024-07-12 VITALS
SYSTOLIC BLOOD PRESSURE: 119 MMHG | BODY MASS INDEX: 34.9 KG/M2 | DIASTOLIC BLOOD PRESSURE: 73 MMHG | WEIGHT: 209.7 LBS | HEART RATE: 87 BPM

## 2024-07-12 DIAGNOSIS — O10.919 CHRONIC HYPERTENSION AFFECTING PREGNANCY (HHS-HCC): ICD-10-CM

## 2024-07-12 DIAGNOSIS — Z3A.32 32 WEEKS GESTATION OF PREGNANCY (HHS-HCC): Primary | ICD-10-CM

## 2024-07-12 DIAGNOSIS — Z34.83 ENCOUNTER FOR SUPERVISION OF OTHER NORMAL PREGNANCY IN THIRD TRIMESTER (HHS-HCC): ICD-10-CM

## 2024-07-12 DIAGNOSIS — O99.011 ANEMIA DURING PREGNANCY IN FIRST TRIMESTER (HHS-HCC): ICD-10-CM

## 2024-07-12 DIAGNOSIS — Z3A.09 9 WEEKS GESTATION OF PREGNANCY (HHS-HCC): ICD-10-CM

## 2024-07-12 PROBLEM — Z34.93 ENCOUNTER FOR SUPERVISION OF NORMAL PREGNANCY IN THIRD TRIMESTER (HHS-HCC): Status: ACTIVE | Noted: 2024-02-27

## 2024-07-12 PROCEDURE — 76816 OB US FOLLOW-UP PER FETUS: CPT

## 2024-07-12 PROCEDURE — 76819 FETAL BIOPHYS PROFIL W/O NST: CPT

## 2024-07-12 RX ORDER — DIPHENHYDRAMINE HCL 25 MG
25 CAPSULE ORAL EVERY 6 HOURS PRN
Qty: 30 CAPSULE | Refills: 0 | Status: SHIPPED | OUTPATIENT
Start: 2024-07-12 | End: 2024-07-22

## 2024-07-12 ASSESSMENT — ENCOUNTER SYMPTOMS
ALLERGIC/IMMUNOLOGIC NEGATIVE: 0
HEMATOLOGIC/LYMPHATIC NEGATIVE: 0
ENDOCRINE NEGATIVE: 0
NEUROLOGICAL NEGATIVE: 0
PSYCHIATRIC NEGATIVE: 0
MUSCULOSKELETAL NEGATIVE: 0
EYES NEGATIVE: 0
RESPIRATORY NEGATIVE: 0
CARDIOVASCULAR NEGATIVE: 0
CONSTITUTIONAL NEGATIVE: 0
GASTROINTESTINAL NEGATIVE: 0

## 2024-07-12 ASSESSMENT — PATIENT HEALTH QUESTIONNAIRE - PHQ9
2. FEELING DOWN, DEPRESSED OR HOPELESS: NOT AT ALL
SUM OF ALL RESPONSES TO PHQ9 QUESTIONS 1 AND 2: 0
1. LITTLE INTEREST OR PLEASURE IN DOING THINGS: NOT AT ALL

## 2024-07-12 ASSESSMENT — PAIN SCALES - GENERAL: PAINLEVEL_OUTOF10: 0 - NO PAIN

## 2024-07-12 ASSESSMENT — PAIN - FUNCTIONAL ASSESSMENT: PAIN_FUNCTIONAL_ASSESSMENT: 0-10

## 2024-07-12 NOTE — PROGRESS NOTES
OB Follow-up  24     SUBJECTIVE    HPI: Lita Cedeno is a 33 y.o.  at 32w5d here for RPNV. Denies contractions, bleeding, or LOF. Reports normal fetal movement. Patient reports some kendrick-valle but not actual contractions. Patient interested in [facility] for delivery as she lives closer.     Reporting some nausea daily, not trying anything for it, no vomiting     cHTN  Checks BP 2x a week but doesn't write down and not sure of the numbers.   Growth US today   Taking baby ASA  Denies headaches, eye changes, swollen feet    Hx of heart murmur:  - no CPs, no SOB  - seen by cardio in 2019 pregnancy, no fu required  - fetal echo?     Anemia - po iron taking  Last Hgb 10.5, retic 2.2%     Routine   PP BC - TL or levonorgestrel iud- scared of the epidural/spinal but doesn't want any more pregnancies.   Breastfeeding- has breast pump   Declined Tdap       Problem List Items Addressed This Visit       Encounter for supervision of normal pregnancy in third trimester ([facility])     - UTD  - discussed TL, patient interested in outpatient procedure to be schedule at PP visit         Relevant Medications    diphenhydrAMINE (BENADryl) 25 mg capsule    Chronic hypertension affecting pregnancy ([facility])     - BP log for next visit  - growth US for 36 wks (4 wks) to schedule at 36 wks           Relevant Orders    US MAC OB imaging order    Anemia during pregnancy in first trimester ([facility])     - continue PO iron  - repeat CBC and retic sent         Relevant Orders    CBC Anemia Panel With Reflex, Pregnancy    Reticulocytes    32 weeks gestation of pregnancy ([facility]) - Primary        OBJECTIVE  Visit Vitals  /73   Pulse 87   Wt 95.1 kg (209 lb 11.2 oz)   LMP  (LMP Unknown)   BMI 34.90 kg/m²   OB Status Pregnant   Smoking Status Never   BSA 2.09 m²      FHT: US  FH: 32 cm    ASSESSMENT & PLAN    Lita Cedeno is a 33 y.o.  at 32w5d here for the following concerns we addressed  today:    Chronic hypertension affecting pregnancy ([facility])  - BP log for next visit  - growth US for 36 wks (4 wks) to schedule at 36 wks      Encounter for supervision of normal pregnancy in third trimester ([facility])  - UTD  - discussed TL, patient interested in outpatient procedure to be schedule at PP visit    Anemia during pregnancy in first trimester ([facility])  - continue PO iron  - repeat CBC and retic sent       Orders Placed This Encounter   Procedures    US MAC OB imaging order     Standing Status:   Future     Standing Expiration Date:   7/12/2025     Order Specific Question:   Reason for exam:     Answer:   growth     Order Specific Question:   Radiologist to Determine Optimal Study     Answer:   Yes     Order Specific Question:   Release result to [employer]     Answer:   Immediate [1]     Order Specific Question:   Is this exam part of a Research Study? If Yes, link this order to the research study     Answer:   No    CBC Anemia Panel With Reflex, Pregnancy     Standing Status:   Future     Standing Expiration Date:   7/12/2025     Order Specific Question:   Release result to [employer]     Answer:   Immediate [1]    Reticulocytes     Standing Status:   Future     Standing Expiration Date:   7/12/2025     Order Specific Question:   Release result to MyChart     Answer:   Immediate [1]        RTC in 2 weeks    Patient seen and evaluated with Dr. Magdi Guillermo MD  PGY-1, Obstetrics and Gynecology

## 2024-07-15 NOTE — PROGRESS NOTES
I saw and evaluated the patient. I personally obtained the key and critical portions of the history and physical exam or was physically present for key and critical portions performed by the resident/fellow. I reviewed the resident/fellow's documentation and discussed the patient with the resident/fellow. I agree with the resident/fellow's medical decision making as documented in the note.    Sally Fairbanks MD

## 2024-07-26 ENCOUNTER — LAB (OUTPATIENT)
Dept: LAB | Facility: LAB | Age: 34
End: 2024-07-26
Payer: COMMERCIAL

## 2024-07-26 ENCOUNTER — ROUTINE PRENATAL (OUTPATIENT)
Dept: OBSTETRICS AND GYNECOLOGY | Facility: CLINIC | Age: 34
End: 2024-07-26
Payer: COMMERCIAL

## 2024-07-26 VITALS — WEIGHT: 214.2 LBS | SYSTOLIC BLOOD PRESSURE: 114 MMHG | BODY MASS INDEX: 35.64 KG/M2 | DIASTOLIC BLOOD PRESSURE: 70 MMHG

## 2024-07-26 DIAGNOSIS — Z34.83 ENCOUNTER FOR SUPERVISION OF OTHER NORMAL PREGNANCY IN THIRD TRIMESTER (HHS-HCC): ICD-10-CM

## 2024-07-26 DIAGNOSIS — Z3A.32 32 WEEKS GESTATION OF PREGNANCY (HHS-HCC): ICD-10-CM

## 2024-07-26 DIAGNOSIS — O99.011 ANEMIA DURING PREGNANCY IN FIRST TRIMESTER (HHS-HCC): ICD-10-CM

## 2024-07-26 DIAGNOSIS — O10.919 CHRONIC HYPERTENSION AFFECTING PREGNANCY (HHS-HCC): ICD-10-CM

## 2024-07-26 LAB
ERYTHROCYTE [DISTWIDTH] IN BLOOD BY AUTOMATED COUNT: 13 % (ref 11.5–14.5)
FERRITIN SERPL-MCNC: 25 NG/ML
FOLATE SERPL-MCNC: 13.1 NG/ML
HCT VFR BLD AUTO: 28.8 % (ref 36–46)
HGB BLD-MCNC: 9.8 G/DL (ref 12–16)
HGB RETIC QN: 36 PG (ref 28–38)
IMMATURE RETIC FRACTION: 24.5 %
IRON SATN MFR SERPL: 20 %
IRON SERPL-MCNC: 71 UG/DL
MCH RBC QN AUTO: 32 PG (ref 26–34)
MCHC RBC AUTO-ENTMCNC: 34 G/DL (ref 32–36)
MCV RBC AUTO: 94 FL (ref 80–100)
NRBC BLD-RTO: 0 /100 WBCS (ref 0–0)
PLATELET # BLD AUTO: 211 X10*3/UL (ref 150–450)
RBC # BLD AUTO: 3.06 X10*6/UL (ref 4–5.2)
REFLEX ADDED, ANEMIA PANEL: NORMAL
RETICS #: 0.09 X10*6/UL (ref 0.02–0.08)
RETICS/RBC NFR AUTO: 2.8 % (ref 0.5–2)
TIBC SERPL-MCNC: 348 UG/DL
UIBC SERPL-MCNC: 277 UG/DL
VIT B12 SERPL-MCNC: 264 PG/ML
WBC # BLD AUTO: 6.3 X10*3/UL (ref 4.4–11.3)

## 2024-07-26 PROCEDURE — 82607 VITAMIN B-12: CPT

## 2024-07-26 PROCEDURE — 85027 COMPLETE CBC AUTOMATED: CPT

## 2024-07-26 PROCEDURE — 85045 AUTOMATED RETICULOCYTE COUNT: CPT

## 2024-07-26 PROCEDURE — 36415 COLL VENOUS BLD VENIPUNCTURE: CPT

## 2024-07-26 PROCEDURE — 83550 IRON BINDING TEST: CPT

## 2024-07-26 PROCEDURE — 82728 ASSAY OF FERRITIN: CPT

## 2024-07-26 PROCEDURE — 99213 OFFICE O/P EST LOW 20 MIN: CPT | Mod: GC,TH

## 2024-07-26 PROCEDURE — 82746 ASSAY OF FOLIC ACID SERUM: CPT

## 2024-07-26 RX ORDER — FAMOTIDINE 20 MG/1
20 TABLET, FILM COATED ORAL 2 TIMES DAILY
Qty: 90 TABLET | Refills: 3 | Status: SHIPPED | OUTPATIENT
Start: 2024-07-26 | End: 2025-07-26

## 2024-07-26 NOTE — PROGRESS NOTES
OB Follow-up  24     SUBJECTIVE    HPI: Lita Cedeno is a 33 y.o.  at 34w5d here for RPNV. Denies bleeding, or LOF. Reports normal fetal movement. Patient reports some contractions this AM, but they have resolved today. Feels them every day but they don't last long, intermittent. No headaches today. Had a dizzy spell at [place] and hadn't eaten or drank anything that day. Reporting some nausea but has eased up recently. Reporting heartburn.     cHTN:   Has BP cuff at home, doesn't check. Takes ldASA daily. Has growth US on . No CP, SOB.     Still taking iron pills okay.     Routine PNC:   Hasn't been able to log into [employer] so wasn't able to look at BTL info.   Declined Tdap again today    Pregnancy complicated by   Problem List Items Addressed This Visit       Encounter for supervision of normal pregnancy in third trimester ([facility])     - declined tdap today  - ordered Pepcid for heartburn  - signed tubal consents today, still undecided on PPBC  - UTD otherwise           Relevant Medications    famotidine (Pepcid) 20 mg tablet    Chronic hypertension affecting pregnancy ([facility])    Anemia during pregnancy in first trimester ([facility])     - cont PO jesus  - encouraged to get CBC, retic (ordered last visit)         32 weeks gestation of pregnancy ([facility])        OBJECTIVE  Visit Vitals  /70 Comment: 90   Wt 97.2 kg (214 lb 3.2 oz)   LMP  (LMP Unknown)   BMI 35.64 kg/m²   OB Status Pregnant   Smoking Status Never   BSA 2.11 m²      FHT: 150  FH: 34    ASSESSMENT & PLAN    Lita Cedeno is a 33 y.o.  at 34w5d here for the following concerns we addressed today:    Encounter for supervision of normal pregnancy in third trimester ([facility])  - declined tdap today  - ordered Pepcid for heartburn  - signed tubal consents today, still undecided on PPBC  - UTD otherwise      Anemia during pregnancy in first trimester ([facility])  - cont PO jesus  - encouraged to get CBC, retic  (ordered last visit)       No orders of the defined types were placed in this encounter.       RTC in 2 weeks    Patient seen and evaluated with Dr. Ara Guillermo MD  PGY-1, Obstetrics and Gynecology

## 2024-07-26 NOTE — ASSESSMENT & PLAN NOTE
- declined tdap today  - ordered Pepcid for heartburn  - signed tubal consents today, still undecided on PPBC  - UTD otherwise

## 2024-07-27 NOTE — PROGRESS NOTES
I saw and evaluated the patient. I personally obtained the key and critical portions of the history and physical exam or was physically present for key and critical portions performed by the resident/fellow. I reviewed the resident/fellow's documentation and discussed the patient with the resident/fellow. I agree with the resident/fellow's medical decision making as documented in the note.    Britni Bullock MD

## 2024-08-08 ENCOUNTER — APPOINTMENT (OUTPATIENT)
Dept: OBSTETRICS AND GYNECOLOGY | Facility: CLINIC | Age: 34
End: 2024-08-08
Payer: COMMERCIAL

## 2024-08-08 ENCOUNTER — HOSPITAL ENCOUNTER (OUTPATIENT)
Dept: RADIOLOGY | Facility: CLINIC | Age: 34
Discharge: HOME | End: 2024-08-08
Payer: COMMERCIAL

## 2024-08-08 VITALS
HEART RATE: 101 BPM | DIASTOLIC BLOOD PRESSURE: 77 MMHG | BODY MASS INDEX: 35.4 KG/M2 | SYSTOLIC BLOOD PRESSURE: 115 MMHG | WEIGHT: 212.7 LBS

## 2024-08-08 DIAGNOSIS — O99.011 ANEMIA DURING PREGNANCY IN FIRST TRIMESTER (HHS-HCC): ICD-10-CM

## 2024-08-08 DIAGNOSIS — O10.013 PRE-EXISTING ESSENTIAL HYPERTENSION COMPLICATING PREGNANCY, THIRD TRIMESTER (HHS-HCC): ICD-10-CM

## 2024-08-08 DIAGNOSIS — Z34.83 ENCOUNTER FOR SUPERVISION OF OTHER NORMAL PREGNANCY IN THIRD TRIMESTER (HHS-HCC): Primary | ICD-10-CM

## 2024-08-08 DIAGNOSIS — O10.919 CHRONIC HYPERTENSION AFFECTING PREGNANCY (HHS-HCC): ICD-10-CM

## 2024-08-08 DIAGNOSIS — Z34.02 ENCOUNTER FOR SUPERVISION OF NORMAL FIRST PREGNANCY IN SECOND TRIMESTER (HHS-HCC): ICD-10-CM

## 2024-08-08 PROCEDURE — 76816 OB US FOLLOW-UP PER FETUS: CPT

## 2024-08-08 PROCEDURE — 99213 OFFICE O/P EST LOW 20 MIN: CPT | Mod: GC,TH

## 2024-08-08 PROCEDURE — 87081 CULTURE SCREEN ONLY: CPT

## 2024-08-08 PROCEDURE — 76819 FETAL BIOPHYS PROFIL W/O NST: CPT

## 2024-08-08 NOTE — ASSESSMENT & PLAN NOTE
- GBS collected today  - Considering PPTL, consents previously signed  - Desires NCB delivery  - Checklist updated in preg overview

## 2024-08-08 NOTE — PROGRESS NOTES
"Subjective   Patient ID [id number]   Lita Cedeno is a 33 y.o.  at 36w4d with a working estimated date of delivery of 2024, by Ultrasound who presents for a routine prenatal visit. She denies vaginal bleeding, leakage of fluid, decreased fetal movements, or contractions.    cHTN- no meds, taking BP at home and reports normotensive. No HA, vision changes, CP, SOB, RUQ pain.     Anemia- taking PO Fe daily, tolerating well.    Objective   Physical Exam:   Weight: 96.5 kg (212 lb 11.2 oz)  Expected Total Weight Gain: Could not be calculated   Pregravid BMI: Could not be calculated  BP: 115/77  Fetal Heart Rate: 147               Prenatal Labs  Urine Dip:  No results found for: \"KETONESU\", \"GLUCOSEUR\", \"LEUKOCYTESUR\"  Lab Results   Component Value Date    HGB 9.8 (L) 2024    HCT 28.8 (L) 2024    ABO A 2024    HEPBSAG Nonreactive 2024     No results found for: \"PAPPA\", \"AFP\", \"HCG\", \"ESTRIOL\", \"INHBA\"  No results found for: \"GLUF\", \"GLUT1\", \"CJJDTME8BS\", \"GMFVLFN6QN\"      Assessment/Plan   Problem List Items Addressed This Visit             ICD-10-CM    Anemia during pregnancy in first trimester ([facility]) O99.011     - Cont daily PO Fe. Hb with slight drop on last check, Hb 10.5 -> 9.8 however reticulocytes 2.2 -> 2.8, suggesting response to PO Fe         Chronic hypertension affecting pregnancy ([facility]) O10.919     - BP normotensive today  - Asymptomatic  - Growth US today  - For delivery 38-39wks, will task for IOL today         Encounter for supervision of normal pregnancy in third trimester ([facility]) - Primary Z34.93     - GBS collected today  - Considering PPTL, consents previously signed  - Desires NCB delivery  - Checklist updated in preg overview         Relevant Orders    Group B Streptococcus (GBS) Prenatal Screen, Culture       Follow up in 1 week for a routine prenatal visit.    Pt seen and d/w Dr. Magdi Maldonado MD PGY-3      "

## 2024-08-08 NOTE — ASSESSMENT & PLAN NOTE
- BP normotensive today  - Asymptomatic  - Growth US today  - For delivery 38-39wks, will task for IOL today

## 2024-08-08 NOTE — ASSESSMENT & PLAN NOTE
- Cont daily PO Fe. Hb with slight drop on last check, Hb 10.5 -> 9.8 however reticulocytes 2.2 -> 2.8, suggesting response to PO Fe

## 2024-08-09 ENCOUNTER — TELEPHONE (OUTPATIENT)
Dept: OBSTETRICS AND GYNECOLOGY | Facility: CLINIC | Age: 34
End: 2024-08-09
Payer: COMMERCIAL

## 2024-08-09 ENCOUNTER — APPOINTMENT (OUTPATIENT)
Dept: RADIOLOGY | Facility: CLINIC | Age: 34
End: 2024-08-09
Payer: COMMERCIAL

## 2024-08-09 DIAGNOSIS — O10.919 CHRONIC HYPERTENSION AFFECTING PREGNANCY (HHS-HCC): Primary | ICD-10-CM

## 2024-08-09 NOTE — TELEPHONE ENCOUNTER
Pt notified of IOL scheduled for 8/18 @ 5 PM. Pt agrees to present. Sent to Dr Maldonado to place orders    ----- Message from Shirin Maldonado sent at 8/8/2024  4:41 PM EDT -----  Regarding: Schedule IOL  HI, can you please scheudle this pt for IOL at 38-39wks for cHTN on no meds? Thank you!

## 2024-08-11 LAB — GP B STREP GENITAL QL CULT: NORMAL

## 2024-08-15 ENCOUNTER — ROUTINE PRENATAL (OUTPATIENT)
Dept: OBSTETRICS AND GYNECOLOGY | Facility: CLINIC | Age: 34
End: 2024-08-15
Payer: COMMERCIAL

## 2024-08-15 VITALS — SYSTOLIC BLOOD PRESSURE: 126 MMHG | BODY MASS INDEX: 35.45 KG/M2 | WEIGHT: 213 LBS | DIASTOLIC BLOOD PRESSURE: 83 MMHG

## 2024-08-15 DIAGNOSIS — O10.919 CHRONIC HYPERTENSION AFFECTING PREGNANCY (HHS-HCC): ICD-10-CM

## 2024-08-15 DIAGNOSIS — Z30.2 REQUEST FOR STERILIZATION: ICD-10-CM

## 2024-08-15 DIAGNOSIS — Z34.83 ENCOUNTER FOR SUPERVISION OF OTHER NORMAL PREGNANCY IN THIRD TRIMESTER (HHS-HCC): Primary | ICD-10-CM

## 2024-08-15 DIAGNOSIS — Z3A.37 37 WEEKS GESTATION OF PREGNANCY (HHS-HCC): ICD-10-CM

## 2024-08-15 PROCEDURE — 99213 OFFICE O/P EST LOW 20 MIN: CPT | Mod: TH | Performed by: ADVANCED PRACTICE MIDWIFE

## 2024-08-15 NOTE — PROGRESS NOTES
Khari Cedeno is a 33 y.o.  at 37w4d with a working estimated date of delivery of 2024, by Ultrasound who presents for a routine prenatal visit.     She denies vaginal bleeding, leakage of fluid, decreased fetal movements, or contractions.    Feeling well, no complaints. Feeling nervous about IOL.     Objective   Physical Exam:   Weight: 96.6 kg (213 lb)  Expected Total Weight Gain: Could not be calculated   Pregravid BMI: Could not be calculated  BP: 126/83  Fetal Heart Rate: 152 Fundal Height (cm): 38 cm Presentation: Vertex           Postpartum Depression: Medium Risk (2024)    [place]  Depression Scale     Last EPDS Total Score: 9     Last EPDS Self Harm Result: Never        Problem List Items Addressed This Visit       Request for sterilization    Encounter for supervision of normal pregnancy in third trimester ([facility]) - Primary    Chronic hypertension affecting pregnancy ([facility])    Overview     - BP at first prenatal visit 130/83  - Not on meds   - Baseline HELLP labs  WNL   - Started on daily bASA ppx   [X] Repeat growth at 36 weeks - --> EFW 28% 2702g         37 weeks gestation of pregnancy ([facility])     Scheduled for IOL   Reviewed lab results GBS negative   Reviewed growth US WNL  EFW 27%   Patient desires permanent sterilization, consent previously signed  Discussed expectations and methods used for IOL  IOL scheduled 24  Reviewed s/sx of labor, warning signs, fetal movement counts, and when to call provider      Abby Timmons, ROSITA-CNFINA, ROSITA-CNP

## 2024-08-18 ENCOUNTER — ANESTHESIA (OUTPATIENT)
Dept: OBSTETRICS AND GYNECOLOGY | Facility: HOSPITAL | Age: 34
End: 2024-08-18
Payer: COMMERCIAL

## 2024-08-18 ENCOUNTER — ANESTHESIA EVENT (OUTPATIENT)
Dept: OBSTETRICS AND GYNECOLOGY | Facility: HOSPITAL | Age: 34
End: 2024-08-18
Payer: COMMERCIAL

## 2024-08-18 ENCOUNTER — APPOINTMENT (OUTPATIENT)
Dept: OBSTETRICS AND GYNECOLOGY | Facility: HOSPITAL | Age: 34
End: 2024-08-18
Payer: COMMERCIAL

## 2024-08-18 ENCOUNTER — HOSPITAL ENCOUNTER (INPATIENT)
Facility: HOSPITAL | Age: 34
End: 2024-08-18
Attending: STUDENT IN AN ORGANIZED HEALTH CARE EDUCATION/TRAINING PROGRAM | Admitting: STUDENT IN AN ORGANIZED HEALTH CARE EDUCATION/TRAINING PROGRAM
Payer: COMMERCIAL

## 2024-08-18 VITALS
TEMPERATURE: 98.2 F | HEIGHT: 65 IN | HEART RATE: 82 BPM | RESPIRATION RATE: 18 BRPM | OXYGEN SATURATION: 98 % | BODY MASS INDEX: 35.89 KG/M2 | WEIGHT: 215.39 LBS | SYSTOLIC BLOOD PRESSURE: 122 MMHG | DIASTOLIC BLOOD PRESSURE: 66 MMHG

## 2024-08-18 DIAGNOSIS — O10.919 CHRONIC HYPERTENSION AFFECTING PREGNANCY (HHS-HCC): ICD-10-CM

## 2024-08-18 PROBLEM — Z34.90 ENCOUNTER FOR INDUCTION OF LABOR (HHS-HCC): Status: ACTIVE | Noted: 2024-08-18

## 2024-08-18 LAB
ABO GROUP (TYPE) IN BLOOD: NORMAL
ANTIBODY SCREEN: NORMAL
ERYTHROCYTE [DISTWIDTH] IN BLOOD BY AUTOMATED COUNT: 12.4 % (ref 11.5–14.5)
HCT VFR BLD AUTO: 27.6 % (ref 36–46)
HGB BLD-MCNC: 9.5 G/DL (ref 12–16)
MCH RBC QN AUTO: 31.7 PG (ref 26–34)
MCHC RBC AUTO-ENTMCNC: 34.4 G/DL (ref 32–36)
MCV RBC AUTO: 92 FL (ref 80–100)
NRBC BLD-RTO: 0 /100 WBCS (ref 0–0)
PLATELET # BLD AUTO: 233 X10*3/UL (ref 150–450)
RBC # BLD AUTO: 3 X10*6/UL (ref 4–5.2)
RH FACTOR (ANTIGEN D): NORMAL
WBC # BLD AUTO: 6.3 X10*3/UL (ref 4.4–11.3)

## 2024-08-18 PROCEDURE — 36415 COLL VENOUS BLD VENIPUNCTURE: CPT

## 2024-08-18 PROCEDURE — 1120000001 HC OB PRIVATE ROOM DAILY

## 2024-08-18 PROCEDURE — 59050 FETAL MONITOR W/REPORT: CPT

## 2024-08-18 PROCEDURE — 2500000004 HC RX 250 GENERAL PHARMACY W/ HCPCS (ALT 636 FOR OP/ED)

## 2024-08-18 PROCEDURE — 7210000002 HC LABOR PER HOUR

## 2024-08-18 PROCEDURE — 86780 TREPONEMA PALLIDUM: CPT

## 2024-08-18 PROCEDURE — 85027 COMPLETE CBC AUTOMATED: CPT

## 2024-08-18 PROCEDURE — 3E033VJ INTRODUCTION OF OTHER HORMONE INTO PERIPHERAL VEIN, PERCUTANEOUS APPROACH: ICD-10-PCS

## 2024-08-18 PROCEDURE — 86901 BLOOD TYPING SEROLOGIC RH(D): CPT

## 2024-08-18 RX ORDER — MISOPROSTOL 200 UG/1
800 TABLET ORAL ONCE AS NEEDED
Status: DISCONTINUED | OUTPATIENT
Start: 2024-08-18 | End: 2024-08-19 | Stop reason: HOSPADM

## 2024-08-18 RX ORDER — LABETALOL HYDROCHLORIDE 5 MG/ML
20 INJECTION, SOLUTION INTRAVENOUS ONCE AS NEEDED
Status: DISCONTINUED | OUTPATIENT
Start: 2024-08-18 | End: 2024-08-19 | Stop reason: HOSPADM

## 2024-08-18 RX ORDER — OXYTOCIN 10 [USP'U]/ML
10 INJECTION, SOLUTION INTRAMUSCULAR; INTRAVENOUS ONCE AS NEEDED
Status: DISCONTINUED | OUTPATIENT
Start: 2024-08-18 | End: 2024-08-19 | Stop reason: HOSPADM

## 2024-08-18 RX ORDER — SODIUM CHLORIDE, SODIUM LACTATE, POTASSIUM CHLORIDE, CALCIUM CHLORIDE 600; 310; 30; 20 MG/100ML; MG/100ML; MG/100ML; MG/100ML
125 INJECTION, SOLUTION INTRAVENOUS CONTINUOUS
Status: DISCONTINUED | OUTPATIENT
Start: 2024-08-18 | End: 2024-08-19

## 2024-08-18 RX ORDER — LOPERAMIDE HYDROCHLORIDE 2 MG/1
4 CAPSULE ORAL EVERY 2 HOUR PRN
Status: DISCONTINUED | OUTPATIENT
Start: 2024-08-18 | End: 2024-08-19 | Stop reason: HOSPADM

## 2024-08-18 RX ORDER — CARBOPROST TROMETHAMINE 250 UG/ML
250 INJECTION, SOLUTION INTRAMUSCULAR ONCE AS NEEDED
Status: DISCONTINUED | OUTPATIENT
Start: 2024-08-18 | End: 2024-08-19 | Stop reason: HOSPADM

## 2024-08-18 RX ORDER — TERBUTALINE SULFATE 1 MG/ML
0.25 INJECTION SUBCUTANEOUS ONCE AS NEEDED
Status: DISCONTINUED | OUTPATIENT
Start: 2024-08-18 | End: 2024-08-19 | Stop reason: HOSPADM

## 2024-08-18 RX ORDER — LIDOCAINE HYDROCHLORIDE 10 MG/ML
30 INJECTION INFILTRATION; PERINEURAL ONCE AS NEEDED
Status: DISCONTINUED | OUTPATIENT
Start: 2024-08-18 | End: 2024-08-19 | Stop reason: HOSPADM

## 2024-08-18 RX ORDER — HYDRALAZINE HYDROCHLORIDE 20 MG/ML
5 INJECTION INTRAMUSCULAR; INTRAVENOUS ONCE AS NEEDED
Status: DISCONTINUED | OUTPATIENT
Start: 2024-08-18 | End: 2024-08-19 | Stop reason: HOSPADM

## 2024-08-18 RX ORDER — ONDANSETRON 4 MG/1
4 TABLET, FILM COATED ORAL EVERY 6 HOURS PRN
Status: DISCONTINUED | OUTPATIENT
Start: 2024-08-18 | End: 2024-08-19

## 2024-08-18 RX ORDER — METHYLERGONOVINE MALEATE 0.2 MG/ML
0.2 INJECTION INTRAVENOUS ONCE AS NEEDED
Status: DISCONTINUED | OUTPATIENT
Start: 2024-08-18 | End: 2024-08-19 | Stop reason: HOSPADM

## 2024-08-18 RX ORDER — OXYTOCIN/0.9 % SODIUM CHLORIDE 30/500 ML
2-30 PLASTIC BAG, INJECTION (ML) INTRAVENOUS CONTINUOUS
Status: DISCONTINUED | OUTPATIENT
Start: 2024-08-18 | End: 2024-08-21 | Stop reason: HOSPADM

## 2024-08-18 RX ORDER — METOCLOPRAMIDE HYDROCHLORIDE 5 MG/ML
10 INJECTION INTRAMUSCULAR; INTRAVENOUS EVERY 6 HOURS PRN
Status: DISCONTINUED | OUTPATIENT
Start: 2024-08-18 | End: 2024-08-19

## 2024-08-18 RX ORDER — OXYTOCIN/0.9 % SODIUM CHLORIDE 30/500 ML
60 PLASTIC BAG, INJECTION (ML) INTRAVENOUS ONCE AS NEEDED
Status: COMPLETED | OUTPATIENT
Start: 2024-08-18 | End: 2024-08-19

## 2024-08-18 RX ORDER — TRANEXAMIC ACID 100 MG/ML
1000 INJECTION, SOLUTION INTRAVENOUS ONCE AS NEEDED
Status: DISCONTINUED | OUTPATIENT
Start: 2024-08-18 | End: 2024-08-19 | Stop reason: HOSPADM

## 2024-08-18 RX ORDER — NIFEDIPINE 10 MG/1
10 CAPSULE ORAL ONCE AS NEEDED
Status: DISCONTINUED | OUTPATIENT
Start: 2024-08-18 | End: 2024-08-19 | Stop reason: HOSPADM

## 2024-08-18 RX ORDER — METOCLOPRAMIDE 10 MG/1
10 TABLET ORAL EVERY 6 HOURS PRN
Status: DISCONTINUED | OUTPATIENT
Start: 2024-08-18 | End: 2024-08-19

## 2024-08-18 RX ORDER — ONDANSETRON HYDROCHLORIDE 2 MG/ML
4 INJECTION, SOLUTION INTRAVENOUS EVERY 6 HOURS PRN
Status: DISCONTINUED | OUTPATIENT
Start: 2024-08-18 | End: 2024-08-19

## 2024-08-18 RX ADMIN — Medication 2 MILLI-UNITS/MIN: at 21:30

## 2024-08-18 RX ADMIN — SODIUM CHLORIDE, POTASSIUM CHLORIDE, SODIUM LACTATE AND CALCIUM CHLORIDE 125 ML/HR: 600; 310; 30; 20 INJECTION, SOLUTION INTRAVENOUS at 19:00

## 2024-08-18 SDOH — HEALTH STABILITY: MENTAL HEALTH: WISH TO BE DEAD (PAST 1 MONTH): NO

## 2024-08-18 SDOH — SOCIAL STABILITY: SOCIAL INSECURITY: PHYSICAL ABUSE: DENIES

## 2024-08-18 SDOH — ECONOMIC STABILITY: HOUSING INSECURITY: DO YOU FEEL UNSAFE GOING BACK TO THE PLACE WHERE YOU ARE LIVING?: NO

## 2024-08-18 SDOH — HEALTH STABILITY: MENTAL HEALTH: SUICIDAL BEHAVIOR (LIFETIME): NO

## 2024-08-18 SDOH — SOCIAL STABILITY: SOCIAL INSECURITY: ARE YOU OR HAVE YOU BEEN THREATENED OR ABUSED PHYSICALLY, EMOTIONALLY, OR SEXUALLY BY ANYONE?: NO

## 2024-08-18 SDOH — SOCIAL STABILITY: SOCIAL INSECURITY: DOES ANYONE TRY TO KEEP YOU FROM HAVING/CONTACTING OTHER FRIENDS OR DOING THINGS OUTSIDE YOUR HOME?: NO

## 2024-08-18 SDOH — HEALTH STABILITY: MENTAL HEALTH: NON-SPECIFIC ACTIVE SUICIDAL THOUGHTS (PAST 1 MONTH): NO

## 2024-08-18 SDOH — SOCIAL STABILITY: SOCIAL INSECURITY: HAVE YOU HAD ANY THOUGHTS OF HARMING ANYONE ELSE?: NO

## 2024-08-18 SDOH — SOCIAL STABILITY: SOCIAL INSECURITY: HAS ANYONE EVER THREATENED TO HURT YOUR FAMILY OR YOUR PETS?: NO

## 2024-08-18 SDOH — HEALTH STABILITY: MENTAL HEALTH: WERE YOU ABLE TO COMPLETE ALL THE BEHAVIORAL HEALTH SCREENINGS?: YES

## 2024-08-18 SDOH — SOCIAL STABILITY: SOCIAL INSECURITY: ABUSE SCREEN: ADULT

## 2024-08-18 SDOH — SOCIAL STABILITY: SOCIAL INSECURITY: VERBAL ABUSE: DENIES

## 2024-08-18 SDOH — SOCIAL STABILITY: SOCIAL INSECURITY: DO YOU FEEL ANYONE HAS EXPLOITED OR TAKEN ADVANTAGE OF YOU FINANCIALLY OR OF YOUR PERSONAL PROPERTY?: NO

## 2024-08-18 SDOH — HEALTH STABILITY: MENTAL HEALTH: CURRENT SMOKER: 0

## 2024-08-18 SDOH — SOCIAL STABILITY: SOCIAL INSECURITY: HAVE YOU HAD THOUGHTS OF HARMING ANYONE ELSE?: NO

## 2024-08-18 SDOH — SOCIAL STABILITY: SOCIAL INSECURITY: ARE THERE ANY APPARENT SIGNS OF INJURIES/BEHAVIORS THAT COULD BE RELATED TO ABUSE/NEGLECT?: NO

## 2024-08-18 ASSESSMENT — LIFESTYLE VARIABLES
HOW MANY STANDARD DRINKS CONTAINING ALCOHOL DO YOU HAVE ON A TYPICAL DAY: PATIENT DOES NOT DRINK
AUDIT-C TOTAL SCORE: 0
HOW OFTEN DO YOU HAVE A DRINK CONTAINING ALCOHOL: NEVER
AUDIT-C TOTAL SCORE: 0
SKIP TO QUESTIONS 9-10: 1
HOW OFTEN DO YOU HAVE 6 OR MORE DRINKS ON ONE OCCASION: NEVER

## 2024-08-18 ASSESSMENT — PATIENT HEALTH QUESTIONNAIRE - PHQ9
1. LITTLE INTEREST OR PLEASURE IN DOING THINGS: NOT AT ALL
SUM OF ALL RESPONSES TO PHQ9 QUESTIONS 1 & 2: 0
2. FEELING DOWN, DEPRESSED OR HOPELESS: NOT AT ALL

## 2024-08-18 ASSESSMENT — PAIN SCALES - GENERAL
PAINLEVEL_OUTOF10: 3
PAINLEVEL_OUTOF10: 2
PAINLEVEL_OUTOF10: 0 - NO PAIN

## 2024-08-18 ASSESSMENT — ACTIVITIES OF DAILY LIVING (ADL): LACK_OF_TRANSPORTATION: NO

## 2024-08-18 NOTE — H&P
OB Admission H&P    Assessment/Plan    Lita Cedeno is a 33 y.o.  at 38w0d, JOSE RAUL: 2024, by 19wk US admitted for IOL iso cHTN    IOL  Admitted, consented, scanned, cephalic  Will induce with CRB and pitocin  Pain management options discussed and will be provided at patient request  GBS negative  EFW 2702 g (27%). AC 32% US on  --> extrapolated 2972 g (patient states that largest baby was 8 lbs 6 oz and this baby feels larger)  CEFM, currently Cat I  Delivery plan: patient desires vaginal delivery, patient counseled on risks of labor, vaginal delivery, and possibility of C/S for varying maternal or fetal indications  Frequent movement encouraged   SVE when indicated and anticipate      Pregnancy notables:   cHTN, no meds, normotensive at home, asx  Anemia, on PO iron, Hgb 9.5 on admission, will type and cross for 1 unit  History of maternal childhood murmur - pt previously seen by cards, no follow up needed. Pt did not have fetal echo       Postpartum planning:  Contraception: ppTL vs ppIUD- patient concerned about anaesthesia/epidural with ppTL, encouraged to discuss concerns with anaesthesia   Feeding: breastfeeding     Patient seen and discussed with Dr. Rivas and Dr. Martir HERNANDEZ    I saw and evaluated the patient. I personally obtained the key and critical portions of the history and physical exam or was physically present for key and critical portions performed by the student. I reviewed the student's documentation and discussed the patient with the student. I agree with the student's medical decision making as documented in the note.    Maryse De Luna, APRN-CNM      Subjective     She is feeling well this PM. Good fetal movement. Denies vaginal bleeding., Denies contractions., Denies leaking of fluid.  She denies any HA, CP, SOB, vision changes, or any other concerns.     Prenatal Provider [place]    Pregnancy Problems (from 24 to present)       Problem Noted  Resolved    Encounter for induction of labor ([facility]) 2024 by Kamar Birmingham MD No    37 weeks gestation of pregnancy ([facility]) 8/15/2024 by CIERRA Sabillon, ROSITA-CNP No    Encounter for supervision of normal pregnancy in third trimester ([facility]) 2024 by Dawn Mckeon MD No    Chronic hypertension affecting pregnancy ([facility]) 2024 by Corinne A Bazella, MD No    Overview Addendum 8/15/2024 11:30 AM by CIERRA Sabillon, APRMACIEL-CNP     - BP at first prenatal visit 130/83  - Not on meds   - Baseline HELLP labs  WNL   - Started on daily bASA ppx   [X] Repeat growth at 36 weeks - --> EFW 28% 2702g         Anemia during pregnancy in first trimester ([facility]) 2024 by Corinne A Bazella, MD No    Overview Addendum 2024 12:31 PM by Kylah Aldana MD     Prescribed PO iron           32 weeks gestation of pregnancy ([facility]) 2024 by Kylah Aldana MD 2024 by Roma Guillermo MD            OB History    Para Term  AB Living   6 4 4 0 1 4   SAB IAB Ectopic Multiple Live Births   0 1 0 0 4      # Outcome Date GA Lbr Reginald/2nd Weight Sex Type Anes PTL Lv   6 Current            5 Term      Vag-Spont  N PEGGY   4 Term 2019 39w4d    Vag-Spont  N PEGGY   3 Term 07/15/17 39w4d  2.92 kg M Vag-Spont  N PEGGY   2 Term 11/03/15   3.799 kg M Vag-Spont   PEGGY   1 IAB                History reviewed. No pertinent surgical history.    Social History     Tobacco Use    Smoking status: Never     Passive exposure: Never    Smokeless tobacco: Never   Substance Use Topics    Alcohol use: Not Currently     Alcohol/week: 2.0 standard drinks of alcohol     Types: 1 Glasses of wine, 1 Shots of liquor per week     Comment: socailly       No Known Allergies    Medications Prior to Admission   Medication Sig Dispense Refill Last Dose    aspirin 81 mg EC tablet Take 1 tablet (81 mg) by mouth once daily. 30 tablet 11 2024 at 1600    blood pressure monitor (Blood Pressure Kit) kit 1  "each once daily. 1 kit 0 Past Week    blood pressure test kit-large kit Use to monitor blood pressure daily as directed 1 each 0 Past Week    ferrous sulfate, 325 mg ferrous sulfate, tablet Take 1 tablet by mouth once daily. 90 tablet 1 8/18/2024 at 1600    prenatal vitamin, iron-folic, (Prenatal) 27 mg iron-800 mcg folic acid tablet Take 1 tablet by mouth once daily. 30 tablet 11 8/18/2024 at 1600    famotidine (Pepcid) 20 mg tablet Take 1 tablet (20 mg) by mouth 2 times a day. 90 tablet 3      Objective     Last Vitals  Temp Pulse Resp BP MAP O2 Sat   36.7 °C (98.1 °F) 101 18 122/68 89 98 %         Physical Exam  General: NAD, mood appropriate  Cardiopulmonary: warm and well perfused, breathing comfortably on room air  Abdomen: Gravid, non-tender  Extremities: Symmetric without significant edema  Speculum Exam: deferred  Cervix: 1 /70 /-3      Fetal Monitoring  Baseline: 140 bpm, Variability: Moderate, Accelerations: Present and Decelerations: None  Uterine Activity: Irregular contractions  Interpretation: Category 1    Bedside Ultrasound: YES, Findings cephalic     Lab Results   Component Value Date    ABO A 08/18/2024    LABRH POS 08/18/2024    ABSCRN NEG 08/18/2024     Lab Results   Component Value Date    WBC 6.3 08/18/2024    HGB 9.5 (L) 08/18/2024    HCT 27.6 (L) 08/18/2024     08/18/2024     0   Lab Value Date/Time    GRPBSTREP No Group B Streptococcus (GBS) isolated 08/08/2024 1509     No results found for: \"GLUCOSE\", \"NA\", \"K\", \"CL\", \"CO2\", \"ANIONGAP\", \"BUN\", \"CREATININE\", \"EGFR\", \"CALCIUM\", \"ALBUMIN\", \"PROT\", \"ALKPHOS\", \"ALT\", \"AST\", \"BILITOT\"   Results from last 7 days   Lab Units 08/18/24  1805   WBC AUTO x10*3/uL 6.3   HEMOGLOBIN g/dL 9.5*   HEMATOCRIT % 27.6*   PLATELETS AUTO x10*3/uL 233        Prenatal labs reviewed, not remarkable      "

## 2024-08-19 LAB
ABO GROUP (TYPE) IN BLOOD: NORMAL
ANTIBODY SCREEN: NORMAL
RH FACTOR (ANTIGEN D): NORMAL
TREPONEMA PALLIDUM IGG+IGM AB [PRESENCE] IN SERUM OR PLASMA BY IMMUNOASSAY: NONREACTIVE

## 2024-08-19 PROCEDURE — 2500000005 HC RX 250 GENERAL PHARMACY W/O HCPCS: Performed by: ADVANCED PRACTICE MIDWIFE

## 2024-08-19 PROCEDURE — 7210000002 HC LABOR PER HOUR

## 2024-08-19 PROCEDURE — 10907ZC DRAINAGE OF AMNIOTIC FLUID, THERAPEUTIC FROM PRODUCTS OF CONCEPTION, VIA NATURAL OR ARTIFICIAL OPENING: ICD-10-PCS | Performed by: ADVANCED PRACTICE MIDWIFE

## 2024-08-19 PROCEDURE — 7100000016 HC LABOR RECOVERY PER HOUR

## 2024-08-19 PROCEDURE — 2500000004 HC RX 250 GENERAL PHARMACY W/ HCPCS (ALT 636 FOR OP/ED)

## 2024-08-19 PROCEDURE — 2500000001 HC RX 250 WO HCPCS SELF ADMINISTERED DRUGS (ALT 637 FOR MEDICARE OP)

## 2024-08-19 PROCEDURE — 59409 OBSTETRICAL CARE: CPT | Performed by: ADVANCED PRACTICE MIDWIFE

## 2024-08-19 PROCEDURE — 2500000005 HC RX 250 GENERAL PHARMACY W/O HCPCS

## 2024-08-19 PROCEDURE — 36415 COLL VENOUS BLD VENIPUNCTURE: CPT

## 2024-08-19 PROCEDURE — 1100000001 HC PRIVATE ROOM DAILY

## 2024-08-19 PROCEDURE — 88307 TISSUE EXAM BY PATHOLOGIST: CPT | Mod: TC,SUR

## 2024-08-19 PROCEDURE — 2500000001 HC RX 250 WO HCPCS SELF ADMINISTERED DRUGS (ALT 637 FOR MEDICARE OP): Performed by: ADVANCED PRACTICE MIDWIFE

## 2024-08-19 PROCEDURE — 86901 BLOOD TYPING SEROLOGIC RH(D): CPT

## 2024-08-19 RX ORDER — TRANEXAMIC ACID 100 MG/ML
1000 INJECTION, SOLUTION INTRAVENOUS ONCE AS NEEDED
Status: DISCONTINUED | OUTPATIENT
Start: 2024-08-19 | End: 2024-08-21 | Stop reason: HOSPADM

## 2024-08-19 RX ORDER — ONDANSETRON HYDROCHLORIDE 2 MG/ML
4 INJECTION, SOLUTION INTRAVENOUS EVERY 6 HOURS PRN
Status: DISCONTINUED | OUTPATIENT
Start: 2024-08-19 | End: 2024-08-21 | Stop reason: HOSPADM

## 2024-08-19 RX ORDER — HYDRALAZINE HYDROCHLORIDE 20 MG/ML
5 INJECTION INTRAMUSCULAR; INTRAVENOUS ONCE AS NEEDED
Status: DISCONTINUED | OUTPATIENT
Start: 2024-08-19 | End: 2024-08-21 | Stop reason: HOSPADM

## 2024-08-19 RX ORDER — POLYETHYLENE GLYCOL 3350 17 G/17G
17 POWDER, FOR SOLUTION ORAL 2 TIMES DAILY PRN
Status: DISCONTINUED | OUTPATIENT
Start: 2024-08-19 | End: 2024-08-21 | Stop reason: HOSPADM

## 2024-08-19 RX ORDER — LIDOCAINE 560 MG/1
1 PATCH PERCUTANEOUS; TOPICAL; TRANSDERMAL
Status: DISCONTINUED | OUTPATIENT
Start: 2024-08-19 | End: 2024-08-21 | Stop reason: HOSPADM

## 2024-08-19 RX ORDER — DIPHENHYDRAMINE HCL 25 MG
25 CAPSULE ORAL EVERY 6 HOURS PRN
Status: DISCONTINUED | OUTPATIENT
Start: 2024-08-19 | End: 2024-08-21 | Stop reason: HOSPADM

## 2024-08-19 RX ORDER — ADHESIVE BANDAGE
10 BANDAGE TOPICAL
Status: DISCONTINUED | OUTPATIENT
Start: 2024-08-19 | End: 2024-08-21 | Stop reason: HOSPADM

## 2024-08-19 RX ORDER — OXYTOCIN 10 [USP'U]/ML
10 INJECTION, SOLUTION INTRAMUSCULAR; INTRAVENOUS ONCE AS NEEDED
Status: DISCONTINUED | OUTPATIENT
Start: 2024-08-19 | End: 2024-08-21 | Stop reason: HOSPADM

## 2024-08-19 RX ORDER — METHYLERGONOVINE MALEATE 0.2 MG/ML
0.2 INJECTION INTRAVENOUS ONCE AS NEEDED
Status: DISCONTINUED | OUTPATIENT
Start: 2024-08-19 | End: 2024-08-21 | Stop reason: HOSPADM

## 2024-08-19 RX ORDER — DIPHENHYDRAMINE HYDROCHLORIDE 50 MG/ML
25 INJECTION INTRAMUSCULAR; INTRAVENOUS EVERY 6 HOURS PRN
Status: DISCONTINUED | OUTPATIENT
Start: 2024-08-19 | End: 2024-08-21 | Stop reason: HOSPADM

## 2024-08-19 RX ORDER — LOPERAMIDE HYDROCHLORIDE 2 MG/1
4 CAPSULE ORAL EVERY 2 HOUR PRN
Status: DISCONTINUED | OUTPATIENT
Start: 2024-08-19 | End: 2024-08-21 | Stop reason: HOSPADM

## 2024-08-19 RX ORDER — CARBOPROST TROMETHAMINE 250 UG/ML
250 INJECTION, SOLUTION INTRAMUSCULAR ONCE AS NEEDED
Status: DISCONTINUED | OUTPATIENT
Start: 2024-08-19 | End: 2024-08-21 | Stop reason: HOSPADM

## 2024-08-19 RX ORDER — MISOPROSTOL 200 UG/1
800 TABLET ORAL ONCE AS NEEDED
Status: DISCONTINUED | OUTPATIENT
Start: 2024-08-19 | End: 2024-08-21 | Stop reason: HOSPADM

## 2024-08-19 RX ORDER — NIFEDIPINE 10 MG/1
10 CAPSULE ORAL ONCE AS NEEDED
Status: DISCONTINUED | OUTPATIENT
Start: 2024-08-19 | End: 2024-08-21 | Stop reason: HOSPADM

## 2024-08-19 RX ORDER — OXYTOCIN/0.9 % SODIUM CHLORIDE 30/500 ML
60 PLASTIC BAG, INJECTION (ML) INTRAVENOUS ONCE AS NEEDED
Status: DISCONTINUED | OUTPATIENT
Start: 2024-08-19 | End: 2024-08-21 | Stop reason: HOSPADM

## 2024-08-19 RX ORDER — SIMETHICONE 80 MG
80 TABLET,CHEWABLE ORAL 4 TIMES DAILY PRN
Status: DISCONTINUED | OUTPATIENT
Start: 2024-08-19 | End: 2024-08-21 | Stop reason: HOSPADM

## 2024-08-19 RX ORDER — IBUPROFEN 600 MG/1
600 TABLET ORAL EVERY 6 HOURS
Status: DISCONTINUED | OUTPATIENT
Start: 2024-08-19 | End: 2024-08-19

## 2024-08-19 RX ORDER — LABETALOL HYDROCHLORIDE 5 MG/ML
20 INJECTION, SOLUTION INTRAVENOUS ONCE AS NEEDED
Status: DISCONTINUED | OUTPATIENT
Start: 2024-08-19 | End: 2024-08-21 | Stop reason: HOSPADM

## 2024-08-19 RX ORDER — IBUPROFEN 600 MG/1
600 TABLET ORAL EVERY 6 HOURS SCHEDULED
Status: DISCONTINUED | OUTPATIENT
Start: 2024-08-19 | End: 2024-08-21 | Stop reason: HOSPADM

## 2024-08-19 RX ORDER — ACETAMINOPHEN 325 MG/1
975 TABLET ORAL EVERY 6 HOURS
Status: DISCONTINUED | OUTPATIENT
Start: 2024-08-19 | End: 2024-08-21 | Stop reason: HOSPADM

## 2024-08-19 RX ORDER — BISACODYL 10 MG/1
10 SUPPOSITORY RECTAL DAILY PRN
Status: DISCONTINUED | OUTPATIENT
Start: 2024-08-19 | End: 2024-08-21 | Stop reason: HOSPADM

## 2024-08-19 RX ORDER — ACETAMINOPHEN 325 MG/1
975 TABLET ORAL EVERY 6 HOURS
Status: DISCONTINUED | OUTPATIENT
Start: 2024-08-19 | End: 2024-08-19

## 2024-08-19 RX ORDER — ONDANSETRON 4 MG/1
4 TABLET, FILM COATED ORAL EVERY 6 HOURS PRN
Status: DISCONTINUED | OUTPATIENT
Start: 2024-08-19 | End: 2024-08-21 | Stop reason: HOSPADM

## 2024-08-19 RX ADMIN — ACETAMINOPHEN 975 MG: 325 TABLET ORAL at 19:38

## 2024-08-19 RX ADMIN — Medication 1 EACH: at 16:33

## 2024-08-19 RX ADMIN — Medication: at 08:44

## 2024-08-19 RX ADMIN — IBUPROFEN 600 MG: 600 TABLET, FILM COATED ORAL at 19:38

## 2024-08-19 RX ADMIN — BENZOCAINE AND LEVOMENTHOL 1 APPLICATION: 200; 5 SPRAY TOPICAL at 16:33

## 2024-08-19 RX ADMIN — IBUPROFEN 600 MG: 600 TABLET, FILM COATED ORAL at 13:52

## 2024-08-19 RX ADMIN — ACETAMINOPHEN 975 MG: 325 TABLET ORAL at 13:52

## 2024-08-19 RX ADMIN — Medication 60 MILLI-UNITS/MIN: at 13:30

## 2024-08-19 ASSESSMENT — PAIN SCALES - GENERAL
PAINLEVEL_OUTOF10: 0 - NO PAIN
PAINLEVEL_OUTOF10: 3
PAINLEVEL_OUTOF10: 2
PAINLEVEL_OUTOF10: 0 - NO PAIN
PAINLEVEL_OUTOF10: 4

## 2024-08-19 ASSESSMENT — PAIN DESCRIPTION - DESCRIPTORS: DESCRIPTORS: CRAMPING

## 2024-08-19 NOTE — ANESTHESIA PREPROCEDURE EVALUATION
"Patient: Lita Cedeno    Evaluation Method: In-person visit    Procedure Information    Date: 08/18/24  Procedure: Labor Consult         Relevant Problems   Cardiac   (+) Chronic hypertension affecting pregnancy ([facility])      Pulmonary (within normal limits)      Neuro (within normal limits)      GI (within normal limits)      /Renal (within normal limits)      Liver (within normal limits)      Endocrine (within normal limits)      Hematology   (+) Anemia during pregnancy in first trimester ([facility])      GYN   (+) 37 weeks gestation of pregnancy ([facility])   (+) Encounter for supervision of normal pregnancy in third trimester ([facility])       History reviewed. No pertinent surgical history.     Social History     Tobacco Use   Smoking Status Never    Passive exposure: Never   Smokeless Tobacco Never      Social History     Substance and Sexual Activity   Alcohol Use Not Currently    Alcohol/week: 2.0 standard drinks of alcohol    Types: 1 Glasses of wine, 1 Shots of liquor per week    Comment: socailly      Social History     Substance and Sexual Activity   Drug Use Never         Chemistry    Lab Results   Component Value Date/Time     02/27/2024 1256    K 3.8 02/27/2024 1256     02/27/2024 1256    CO2 24 02/27/2024 1256    BUN 10 02/27/2024 1256    CREATININE 0.53 02/27/2024 1256    Lab Results   Component Value Date/Time    CALCIUM 9.4 02/27/2024 1256    ALKPHOS 23 (L) 02/27/2024 1256    AST 11 02/27/2024 1256    ALT 12 02/27/2024 1256    BILITOT 0.3 02/27/2024 1256          Lab Results   Component Value Date/Time    WBC 6.3 08/18/2024 1805    HGB 9.5 (L) 08/18/2024 1805    HCT 27.6 (L) 08/18/2024 1805     08/18/2024 1805     No results found for: \"PROTIME\", \"PTT\", \"INR\"  No results found for this or any previous visit (from the past 4464 hour(s)).      NPO Detail:  No data recorded     OB/Gyn Evaluation    Present Pregnancy    Patient is pregnant now.  (+) , hypertensive disorder of " pregnancy - chronic hypertension   Obstetric History                Physical Exam    Airway  Mallampati: II  TM distance: >3 FB     Cardiovascular   Rhythm: regular  Rate: normal     Dental    Pulmonary   Breath sounds clear to auscultation     Abdominal            Anesthesia Plan    History of general anesthesia?: no  History of complications of general anesthesia?: no    ASA 2     epidural     The patient is not a current smoker.    Anesthetic plan and risks discussed with patient.  Use of blood products discussed with patient who consented to blood products.    Plan discussed with resident.

## 2024-08-19 NOTE — PROGRESS NOTES
Intrapartum Progress Note    Assessment/Plan   Lita Cedeno is a 33 y.o.  at 38w1d, JOSE RAUL: 2024, by LMP c/w 19wk US Ultrasound admitted for IOL in the setting of    cHTN.  IOL  Method: s/p CRB and AROM for clear (), continue pitocin per protocol ()  Pain management: nitrous oxide ordered; epidural as requested  CEFM, currently Category I  GBS: negative  Next exam: as clinically indicated by maternal or fetal status    cHTN  No meds  Normotensive throughout pregnancy and admission  Asymptomatic    Anemia  On PO iron  Hgb 9.5 on admission  T+C for 1 unit    Maternal conditions  Hx maternal childhood murmur; pt previously seen by cards, no fu needed; pt did not have fetal echo    Seen and discussed with ANGEL Palmer, M4      Subjective   Pt resting comfortably in bed, reports feeling contractions but they are not yet painful. Pt has still been up and moving around the room. She would prefer to avoid epidural if possible, but is amenable if needed. She would like to start with nitrous oxide for now.     Objective   Last Vitals:  Temp Pulse Resp BP MAP Pulse Ox   36.6 °C (97.9 °F) 90 16 126/67   98 %     Vitals Min/Max Last 24 Hours:  Temp  Min: 36 °C (96.8 °F)  Max: 37.1 °C (98.8 °F)  Pulse  Min: 74  Max: 107  Resp  Min: 16  Max: 18  BP  Min: 110/61  Max: 137/86    Intake/Output:  No intake or output data in the 24 hours ending 24 0819    Physical Examination:  General: no acute distress  HEENT: normocephalic, atraumatic  Heart: warm and well perfused  Lungs: breathing comfortably on room air  Abdomen: gravid  Extremities: moving all extremities  Neuro: awake and conversant  Psych: appropriate mood and affect    SVE: 3/80/-3  FHT: 140/mod/+accel/intermittent variables  [place]: q2-3min    AROM for clear, with significant fluid    Lab Review:  Labs in chart have been reviewed

## 2024-08-19 NOTE — SIGNIFICANT EVENT
"Labor Progress Note    Subjective: Patient feeling well. Patient endorsed feeling ready for CRB placement    Objective:  Vitals: /76   Pulse 95   Temp 36.7 °C (98.1 °F) (Temporal)   Resp 18   Ht 1.651 m (5' 5\")   Wt 97.7 kg (215 lb 6.2 oz)   LMP  (LMP Unknown)   SpO2 98%   BMI 35.84 kg/m²   SVE: 1/70/-3  FHT: 140/mod/+accel/-decels  [place]: intermittent- Q4min contractions     CRB inserted through cervical os and inflated with 60cc saline.   Placement of balloon confirmed with gentle traction.   Patient tolerated well.      A/P:  CRB placed  Will start pitocin, will plan to uptitrate per guideline  CEFM, currently Category I  Pain mgt as requested  SVE when indicated  Continue to monitor maternal and fetal status     Seen and discussed with Maryse Chew, M4  OB/GYN     I saw and evaluated the patient. I personally obtained the key and critical portions of the history and physical exam or was physically present for key and critical portions performed by the student. I reviewed the student's documentation and discussed the patient with the student. I agree with the student's medical decision making as documented in the note.   Maryse De Luna, ROSITA-ANGEL    "

## 2024-08-19 NOTE — L&D DELIVERY NOTE
OB Delivery Note  2024  Lita Cedeno  33 y.o.   Vaginal, Spontaneous     Patient pushing with good effort. Head delivered, restituted to HENRY. Shoulders delivered with gentle downward traction following restitution. Postpartum pitocin bolus initiated immediately after birth. Infant placed on maternal abdomen for skin to skin. Cord clamped and cut by patient's sister after 2 min delay. Placenta delivered spontaneously and with gentle downward traction. Fundus palpated firm, midline, and at umbilicus. Vagina, perineum, and labia inspected. Perineum intact, no lacerations. EBL 75. Mom and baby stable.         Gestational Age: 38w1d  /Para:   Quantitative Blood Loss: Admission to Discharge: 75 mL (2024  5:24 PM - 2024  2:25 PM)    Vinh Cedeno [[id number]]      Labor Events    Rupture date/time: 2024 0811  Rupture type: Artificial  Fluid color: Clear  Fluid odor: None  Labor type: Induced Onset of Labor  Labor allowed to proceed with plans for an attempted vaginal birth?: Yes  Induction: Oxytocin  First cervical ripening date/time: 2024  Induction date/time: 2024 1724  Induction indications: Hypertensive Disorder of Pregnancy  Complications: None       Labor Event Times    Labor onset date/time: 2024  Dilation complete date/time: 2024 1259  Start pushing date/time: 2024 1259       Labor Length    2nd stage: 0h 01m  3rd stage: 0h 06m       Placenta    Placenta delivery date/time: 2024 1306  Placenta removal: Spontaneous  Placenta appearance: Intact  Placenta disposition: pathology       Cord    Vessels: 3 vessels  Complications: None  Delayed cord clamping?: Yes  Cord clamped date/time: 2024 13:02:00  Cord blood disposition: Discarded  Gases sent?: Yes  Stem cell collection (by provider): No       Lacerations    Episiotomy: None  Perineal laceration: None  Other lacerations?: No  Repair suture: None       Anesthesia    Method:  None  Analgesics: NITROUS OXIDE - OXYGEN THERAPY       Operative Delivery    Forceps attempted?: No  Vacuum extractor attempted?: No       Shoulder Dystocia    Shoulder dystocia present?: No        Delivery    Birth date/time: 2024 13:00:00  Delivery type: Vaginal, Spontaneous  Complications: None       Resuscitation    Method: None       Apgars    Living status: Living  Apgar Component Scores:  1 min.:  5 min.:  10 min.:  15 min.:  20 min.:    Skin color:  0  1       Heart rate:  2  2       Reflex irritability:  2  2       Muscle tone:  2  2       Respiratory effort:  2  2       Total:  8  9       Apgars assigned by: REINIER       Delivery Providers    Delivering clinician: CIERRA Sabillon, APRN-CNP   Provider Role    Roxie Dugan, RN Delivery Nurse    Maribell Burt, RN Nursery Nurse     Resident                 Intrauterine Device Inserted : No, patient desires PPTL    CIERRA Sabillon APRN-CNP

## 2024-08-19 NOTE — PROGRESS NOTES
Assessment    33 y.o.  at 38w1d  FHT Category 1  Latent labor  GBS neg   Anemia  - admission hg 9.5  cHTN  - normotensive since admission     Plan      Encourage frequent position changes as tolerated  Encourage ambulation as tolerated  Maternal repositioning  Start/Continue pitocin per protocol  Pain management per patient request  Continue assessment of maternal and fetal wellbeing  Recheck as clinically indicated by maternal or fetal status  Anticipate active phase of labor  Anticipate NSVB    Will plan for AROM once fetal head is lower and less ballotable     Maryse De Luna, APRN-CNM    Subjective:  Lita Cedeno is sleeping soundly when entering room siting in a up right position. Vitals remain stable, BP normotensive       Objective:  Fetal Monitoring      Baseline FHR: 135 per minute  Variability: moderate  Accelerations: yes  Decelerations: none  TOCO: regular, every 3 minutes    Cervical Exam:  def    Membrane status: intact    Pitocin is at 20 mu/min.    Vitals:    24 0231 24 0339 24 0431 24 0521   BP: 110/61 122/59 115/63 135/70   Pulse: 89 89 85 74   Resp:  18  18   Temp:  36.5 °C (97.7 °F)  36 °C (96.8 °F)   TempSrc:  Temporal  Temporal   SpO2:  98%  99%   Weight:       Height:

## 2024-08-19 NOTE — SIGNIFICANT EVENT
"Labor Progress Note    Subjective: Patient lying in bed, visibly uncomfortably during contractions. Inhaling nitrous oxide during contractions. She reports this is helping with her discomfort. Sister and cousin/  present at bedside.    Objective:  Vitals: /60   Pulse 77   Temp 36.4 °C (97.5 °F) (Temporal)   Resp 16   Ht 1.651 m (5' 5\")   Wt 97.7 kg (215 lb 6.2 oz)   LMP  (LMP Unknown)   SpO2 100%   BMI 35.84 kg/m²   SVE: 5/80/-2  FHT: 140/mod/+accels/intermittent variables  [place]: q2-3min    A/P:  Continue pitocin per protocol  CEFM, currently Category II due to variable decelerations, however reassured by present moderate variability and accelerations  Currently using nitrous oxide; Epidural as requested  Anticipate JOHANA Foss, M4  OB/GYN    I was present with the medical  student who participated in the documentation of this note. I have personally seen and examined the patient and performed the medical decision-making components. I have reviewed the medical student documentation and verified the findings in the note as written with additions or exceptions as stated in the body of this note.  Abby NORTON     "

## 2024-08-19 NOTE — SIGNIFICANT EVENT
"Labor Progress Note    Subjective: Patient endorsing feeling a little more uncomfortable with contractions- is using the ball to help. She endorses not being able to rest well at this time.  CRB remains in place. Support person at bedside.     Objective:  Vitals: /74   Pulse 90   Temp 36.8 °C (98.2 °F) (Temporal)   Resp 18   Ht 1.651 m (5' 5\")   Wt 97.7 kg (215 lb 6.2 oz)   LMP  (LMP Unknown)   SpO2 100%   BMI 35.84 kg/m²   SVE: deferred, last exam 1/70/-3 @ 1930  FHT: 125/mod/+accel/-decels  [place]: Q2-3    A/P:  CRB remains in place  Continue pitocin per protocol, pitocin currently at 10mu/min  CEFM, currently Category I  Pain management as requested  SVE when clinically indicated  Continue to monitor maternal and fetal status    Seen and discussed with Adia Beckett,   OB/GYN     I saw and evaluated the patient. I personally obtained the key and critical portions of the history and physical exam or was physically present for key and critical portions performed by the student. I reviewed the student's documentation and discussed the student with the student. I agree with the student's medical decision making as documented in the note.      CIERRA Moran    "

## 2024-08-19 NOTE — SIGNIFICANT EVENT
"Labor Progress Note    Subjective: Patient endorses feeling well, she has had some relief since the CRB is out. She is amenable to AROM at this time if clinically indicated. CRB out at 0230. Support person at bedside   pitocin currently at 14mu/min    Objective:  Vitals: /59   Pulse 89   Temp 36.5 °C (97.7 °F) (Temporal)   Resp 18   Ht 1.651 m (5' 5\")   Wt 97.7 kg (215 lb 6.2 oz)   LMP  (LMP Unknown)   SpO2 99%   BMI 35.84 kg/m²   SVE: 3/80/-3 - fetal head high and ballotable   FHT: 130/mod/+accel/+variable decels  [place]: Q4    A/P:  Given fetal head still high/ballotable, will encourage throne position, bouncing on ball, or use of peanut ball to better engage head before AROM attempt  Continue pitocin per protocol,  CEFM, currently Category II- variable decelerations likely in the setting of labor progression, reassured by moderate variability and presence of accelerations  Pain management as requested  SVE when clinically indicated  Continue to monitor maternal and fetal status    Seen and discussed with Adia Beckett, M4  OB/GYN    I saw and evaluated the patient. I personally obtained the key and critical portions of the history and physical exam or was physically present for key and critical portions performed by the student. I reviewed the student's documentation and discussed the patient with the student. I agree with the student's medical decision making as documented in the note.      Maryse De Luna, ROSITA-WILDM    "

## 2024-08-20 PROCEDURE — 1100000001 HC PRIVATE ROOM DAILY

## 2024-08-20 PROCEDURE — 2500000001 HC RX 250 WO HCPCS SELF ADMINISTERED DRUGS (ALT 637 FOR MEDICARE OP): Performed by: ADVANCED PRACTICE MIDWIFE

## 2024-08-20 RX ORDER — MEDROXYPROGESTERONE ACETATE 150 MG/ML
150 INJECTION, SUSPENSION INTRAMUSCULAR ONCE AS NEEDED
Status: COMPLETED | OUTPATIENT
Start: 2024-08-20 | End: 2024-08-21

## 2024-08-20 RX ADMIN — ACETAMINOPHEN 975 MG: 325 TABLET ORAL at 18:10

## 2024-08-20 RX ADMIN — IBUPROFEN 600 MG: 600 TABLET, FILM COATED ORAL at 11:56

## 2024-08-20 RX ADMIN — IBUPROFEN 600 MG: 600 TABLET, FILM COATED ORAL at 00:43

## 2024-08-20 RX ADMIN — IBUPROFEN 600 MG: 600 TABLET, FILM COATED ORAL at 18:10

## 2024-08-20 RX ADMIN — ACETAMINOPHEN 975 MG: 325 TABLET ORAL at 00:43

## 2024-08-20 RX ADMIN — ACETAMINOPHEN 975 MG: 325 TABLET ORAL at 11:56

## 2024-08-20 RX ADMIN — IBUPROFEN 600 MG: 600 TABLET, FILM COATED ORAL at 06:01

## 2024-08-20 RX ADMIN — ACETAMINOPHEN 975 MG: 325 TABLET ORAL at 06:01

## 2024-08-20 ASSESSMENT — PAIN SCALES - GENERAL
PAINLEVEL_OUTOF10: 0 - NO PAIN
PAINLEVEL_OUTOF10: 0 - NO PAIN
PAINLEVEL_OUTOF10: 7
PAINLEVEL_OUTOF10: 2
PAINLEVEL_OUTOF10: 0 - NO PAIN

## 2024-08-20 NOTE — PROGRESS NOTES
Postpartum Progress Note    Subjective   Lita Cedeno is a 33 y.o., , who delivered at 38w1d gestation and is now postpartum day 1.  Hospital course: uncomplicated overall  Pregnancy complications: chronic hypertension, well controlled on no medication     Her pain is well controlled with current medications  She is not having trouble urinating or passing gas   She is ambulating well  She reports no breast or nursing problems, patient is pumping and breastfeeding  She denies emotional concerns today   Her plan for contraception is tubal ligation. Patient is nervous about having a spinal, but would like to still try to get her tubal done today. She was not NPO after midnight in prep for a tubal and so last ate at 930am. Patient not really interested in 6 week tubal and going home with a birth control bridge.      Pregnancy Problems (from 24 to present)       Problem Noted Resolved    Chronic hypertension affecting pregnancy ([facility]) 2024 by Corinne A Bazella, MD No    Priority:  Medium      Overview Addendum 8/15/2024 11:30 AM by ROSITA Sabillon-CNM, ROSITA-CNP     - BP at first prenatal visit 130/83  - Not on meds        Anemia during pregnancy in first trimester ([facility]) 2024 by Corinne A Bazella, MD No    Priority:  Medium      Overview Addendum 2024 12:31 PM by Kylah Aldana MD     Prescribed PO iron  Lab Results   Component Value Date    WBC 6.3 2024    HGB 9.5 (L) 2024    HCT 27.6 (L) 2024    MCV 92 2024     2024           Objective   Allergies:   Patient has no known allergies.         Last Vitals:  Temp Pulse Resp BP MAP Pulse Ox   36 °C (96.8 °F) 69 18 112/65   96 %     Vitals Min/Max Last 24 Hours:  Temp  Min: 36 °C (96.8 °F)  Max: 37.4 °C (99.3 °F)  Pulse  Min: 69  Max: 100  Resp  Min: 16  Max: 18  BP  Min: 112/65  Max: 138/81    Physical Exam:  General: Examination reveals a well developed, well nourished, female, in no acute  distress. She is alert and cooperative.  HEENT: External ears normal. Nose normal, no erythema or discharge.  Lungs: breathing even and unlabored   Cardiac: warm and well perfused   Breasts: nipples intact, filling   Fundus: firm, nontender, at umbilicus, Lochia light  Extremities: no redness or tenderness in the calves or thighs, no edema.  Neurological: alert, oriented, normal speech, no focal findings or movement disorder noted.  Psychological: awake and alert; oriented to person, place, and time.    Assessment/Plan   - continue routine postpartum care  - pain well controlled on po medications  - dvt risk score DVT Score: 5 , prophylactic lovenox indicated  - Hgb:   Results from last 7 days   Lab Units 24  1805   HEMOGLOBIN g/dL 9.5*      - Patient is Rh Positive (Rh+)., baby results pending; Not eligible for Rhogam    Maternal Well-Being  - emotional support provided    [place] Feeding  - breastfeeding/pumping encouraged; lactation consult prn    Contraception  - Patient requesting tubal; MD to assess if possible this admission  - patient switched to NPO in hopes that BTL can be done  - BTL paperwork signed    - discussed options of 6w PP tubal with Depo, Nexplanon, POPs as bridge until then; patient will consider     Dispo  - anticipate d/c on PPD #2 if meeting all postpartum milestones    PAM Florian    I was present with the APRN student who participated in the documentation of this note. I have personally seen and re-examined the patient and performed the medical decision-making components (assessment and plan of care). I have reviewed the APRN student documentation and verified the findings in the note as written with additions or exceptions as stated in the body of this note.    Fatou Delgadillo, ROSITA-ANGEL

## 2024-08-20 NOTE — CARE PLAN
The patient's goals for the shift include to establish adequate breastfeeding routine    The clinical goals for the shift include lochia to remain WNL throughout shift    Over the shift, the patient made progress toward the following goals.   Problem: Postpartum  Goal: Minimal s/sx of HDP and BP<160/110  Outcome: Progressing  Goal: Experiences normal postpartum course  Outcome: Progressing  Goal: Appropriate maternal -  bonding  Outcome: Progressing  Goal: No s/sx infection  Outcome: Progressing

## 2024-08-21 VITALS
HEIGHT: 65 IN | BODY MASS INDEX: 35.89 KG/M2 | RESPIRATION RATE: 18 BRPM | SYSTOLIC BLOOD PRESSURE: 132 MMHG | DIASTOLIC BLOOD PRESSURE: 81 MMHG | HEART RATE: 85 BPM | TEMPERATURE: 98.2 F | OXYGEN SATURATION: 96 % | WEIGHT: 215.39 LBS

## 2024-08-21 PROBLEM — Z28.21 TETANUS, DIPHTHERIA, AND ACELLULAR PERTUSSIS (TDAP) VACCINATION DECLINED: Status: RESOLVED | Noted: 2024-06-21 | Resolved: 2024-08-21

## 2024-08-21 PROBLEM — R35.0 URINARY FREQUENCY: Status: RESOLVED | Noted: 2024-04-08 | Resolved: 2024-08-21

## 2024-08-21 PROBLEM — O10.919 CHRONIC HYPERTENSION AFFECTING PREGNANCY (HHS-HCC): Status: RESOLVED | Noted: 2024-02-27 | Resolved: 2024-08-21

## 2024-08-21 PROBLEM — Z34.93 ENCOUNTER FOR SUPERVISION OF NORMAL PREGNANCY IN THIRD TRIMESTER (HHS-HCC): Status: RESOLVED | Noted: 2024-02-27 | Resolved: 2024-08-21

## 2024-08-21 PROBLEM — O99.011 ANEMIA DURING PREGNANCY IN FIRST TRIMESTER (HHS-HCC): Status: RESOLVED | Noted: 2024-02-27 | Resolved: 2024-08-21

## 2024-08-21 PROBLEM — Z3A.37 37 WEEKS GESTATION OF PREGNANCY (HHS-HCC): Status: RESOLVED | Noted: 2024-08-15 | Resolved: 2024-08-21

## 2024-08-21 PROBLEM — Z34.90 ENCOUNTER FOR INDUCTION OF LABOR (HHS-HCC): Status: RESOLVED | Noted: 2024-08-18 | Resolved: 2024-08-21

## 2024-08-21 PROBLEM — Z3A.39 PREGNANCY WITH 39 COMPLETED WEEKS GESTATION (HHS-HCC): Status: RESOLVED | Noted: 2024-08-21 | Resolved: 2024-08-21

## 2024-08-21 PROBLEM — Z3A.39 PREGNANCY WITH 39 COMPLETED WEEKS GESTATION (HHS-HCC): Status: ACTIVE | Noted: 2024-08-21

## 2024-08-21 PROCEDURE — 2500000004 HC RX 250 GENERAL PHARMACY W/ HCPCS (ALT 636 FOR OP/ED): Performed by: ADVANCED PRACTICE MIDWIFE

## 2024-08-21 PROCEDURE — 2500000001 HC RX 250 WO HCPCS SELF ADMINISTERED DRUGS (ALT 637 FOR MEDICARE OP): Performed by: ADVANCED PRACTICE MIDWIFE

## 2024-08-21 RX ORDER — IBUPROFEN 600 MG/1
600 TABLET ORAL EVERY 6 HOURS PRN
Qty: 60 TABLET | Refills: 1 | Status: SHIPPED | OUTPATIENT
Start: 2024-08-21

## 2024-08-21 RX ORDER — ACETAMINOPHEN 325 MG/1
650 TABLET ORAL EVERY 6 HOURS PRN
Qty: 60 TABLET | Refills: 1 | Status: SHIPPED | OUTPATIENT
Start: 2024-08-21

## 2024-08-21 RX ADMIN — ACETAMINOPHEN 975 MG: 325 TABLET ORAL at 01:03

## 2024-08-21 RX ADMIN — IBUPROFEN 600 MG: 600 TABLET, FILM COATED ORAL at 01:03

## 2024-08-21 RX ADMIN — MEDROXYPROGESTERONE ACETATE 150 MG: 150 INJECTION, SUSPENSION INTRAMUSCULAR at 11:26

## 2024-08-21 RX ADMIN — IBUPROFEN 600 MG: 600 TABLET, FILM COATED ORAL at 06:51

## 2024-08-21 ASSESSMENT — PAIN SCALES - GENERAL
PAINLEVEL_OUTOF10: 0 - NO PAIN
PAINLEVEL_OUTOF10: 0 - NO PAIN
PAINLEVEL_OUTOF10: 2

## 2024-08-21 NOTE — DISCHARGE SUMMARY
Discharge Summary    Admission Date: 8/18/2024  Discharge Date: 8/21/2024    Discharge Diagnosis  Encounter for induction of labor ([facility])    Hospital Course  Delivery Date: 8/19/2024 1:00 PM  Delivery type: Vaginal, Spontaneous   GA at delivery: 38w1d  Outcome: Living  Anesthesia during delivery: None  Intrapartum complications: None  Feeding method: Breastfeeding Status: Yes     Procedures: none  Contraception at discharge: oral contraceptives  Patient would like IUD at PP visit and is still leaning towards tubal ligation - will discuss at PP visit       Patient feeling well. Breastfeeding going well - some pain with latch but not throughout duration of feeding. Patient has  in the past, denies concerns. Bleeding light. Denies any pain or discomfort.     Pertinent Physical Exam At Time of Discharge  General: Examination reveals a well developed, well nourished, female, in no acute distress. She is alert and cooperative.  HEENT: External ears normal. Nose normal, no erythema or discharge.  Lungs: clear to auscultation bilaterally.  Abdomen: soft, gravid, nontender, nondistended, no abnormal masses, no epigastric pain.  Fundus: firm, below umbilicus, and nontender.  Extremities: no redness or tenderness in the calves or thighs, no edema.  Neurological: alert, oriented, normal speech, no focal findings or movement disorder noted.  Psychological: awake and alert; oriented to person, place, and time.    Last Vitals:  Temp Pulse Resp BP MAP Pulse Ox   36.8 °C (98.2 °F) 85 18 132/81 98 96 %     Discharge Meds     Your medication list        CONTINUE taking these medications        Instructions Last Dose Given Next Dose Due   blood pressure monitor kit  Commonly known as: Blood Pressure Kit      1 each once daily.       blood pressure test kit-large kit      Use to monitor blood pressure daily as directed              ASK your doctor about these medications        Instructions Last Dose Given Next Dose Due    aspirin 81 mg EC tablet      Take 1 tablet (81 mg) by mouth once daily.       famotidine 20 mg tablet  Commonly known as: Pepcid      Take 1 tablet (20 mg) by mouth 2 times a day.       ferrous sulfate (325 mg ferrous sulfate) tablet      Take 1 tablet by mouth once daily.       Prenatal 28 mg iron- 800 mcg tablet  Generic drug: prenatal vitamin (iron-folic)      Take 1 tablet by mouth once daily.                 Complications Requiring Follow-Up none     Test Results Pending At Discharge  Pending Labs       Order Current Status    Surgical Pathology Exam - [facility] In process            Outpatient Follow-Up  No future appointments.    I spent 25 minutes in the professional and overall care of this patient.      Abby Timmons, APRN-CNM, APRN-CNP

## 2024-08-21 NOTE — LACTATION NOTE
Lactation Consultant Note  Lactation Consultation  Reason for Consult: Initial assessment  Consultant Name: ASHWINI Hernandez RN IBCLC    Maternal Information  Has mother  before?: Yes  How long did the mother previously breastfeed?: attempted with all children; nursed 2-year-old for 1 year  Previous Maternal Breastfeeding Challenges: None  Infant to breast within first 2 hours of birth?: Yes  Exclusive Pump and Bottle Feed: No    Maternal Assessment  Breast Assessment: Medium, Symmetrical, Soft, Compressible  Nipple Assessment: Intact, Erect  Areola Assessment: Normal    Infant Assessment  Infant Behavior: Quiet alert, Readiness to feed, Feeding cues observed, Rooting response, Sucking  Infant Assessment: Sublingual frenulum (comment), Good cupping of tongue, Palate - high/arch/bubble/normal (know to pediatric team)    Feeding Assessment  Nutrition Source: Breastmilk  Feeding Method: Nursing at the breast  Feeding Position: Cross - cradle, Skin to skin, Both sides, Nipple to nose, Mother needs assistance with latch/positioning  Suck/Feeding: Sustained, Coordinated suck/swallow/breathe, Baby led rhythmically, Audible swallowing  Latch Assessment: Minimal assistance is needed, Instructed on deep latch, Eagerly grasped on to latch, Areolar attachment, Deep latch obtained, Optimal angle of mouth opening, Comfortable with no pain, Sucking and swallowing, Sucks with long jaw movement, Chin and lower lip contact breast first, Bursts of sucking, swallowing, and rest, Flanged lips, Chin moves in rhythmic motion, Comfortable latch    LATCH TOOL  Latch: Grasps breast, tongue down, lips flanged, rhythmic sucking  Audible Swallowing: Spontaneous and intermittent (24 hours old)  Type of Nipple: Everted (After stimulation)  Comfort (Breast/Nipple): Filling, red/small blisters/bruises, mild/moderate discomfort  Hold (Positioning): Minimal assist, teach one side, mother does other, staff holds  LATCH Score: 8    Breast Pump        Other OB Lactation Tools  Lactation Tools: Lanolin    Patient Follow-up  Inpatient Lactation Follow-up Needed : No    Other OB Lactation Documentation  Maternal Risk Factors: Age over 30, primiparity, Hypertension  Infant Risk Factors: Early term birth 37-39 weeks    Recommendations/Summary    This mother reported nipple discomfort while breastfeeding. She was breastfeeding when I came into the room. She was assisted to achieve a deeper latch. I encouraged the use of skin to skin for breastfeeding and suggested the cross-cradle hold with pillow support for latching. We reviewed correct infant body alignment, proper head/breast support, and positioning the infant's nose opposite the maternal nipple. The infant was brought to the mother's breast with a wide, open mouth. He latched readily and well with rhythmic sucking and swallowing. The mother reported the latch as comfortable. She was shown how to use breast massage/compression to keep the infant actively sucking while at the breast. The mother was asked to allow the infant to end the feeding, burp the infant, then offer the second breast if the infant remained interested in continuing to breastfeed. We reviewed the following breastfeeding topics: early milk production; the benefits of skin to skin; frequent feeds with goal of 8-12 feeds/24 hours; the importance of a deep latch for maternal comfort and optimal milk production/transfer; alternating starting breast and allowing the infant to end the feeding; and, the rationale for delaying pumping (unless clinically indicated) and pacifier use until breastfeeding is well-established. The mother has a breast pump for home use. She was given written information for outpatient lactation services. All questions were answered and the mother is offered assistance as needed.

## 2024-08-22 ENCOUNTER — APPOINTMENT (OUTPATIENT)
Dept: OBSTETRICS AND GYNECOLOGY | Facility: CLINIC | Age: 34
End: 2024-08-22
Payer: COMMERCIAL

## 2024-08-23 LAB
LABORATORY COMMENT REPORT: NORMAL
PATH REPORT.FINAL DX SPEC: NORMAL
PATH REPORT.GROSS SPEC: NORMAL
PATH REPORT.RELEVANT HX SPEC: NORMAL
PATH REPORT.TOTAL CANCER: NORMAL

## 2024-08-29 ENCOUNTER — APPOINTMENT (OUTPATIENT)
Dept: OBSTETRICS AND GYNECOLOGY | Facility: CLINIC | Age: 34
End: 2024-08-29
Payer: COMMERCIAL

## 2024-09-26 ENCOUNTER — APPOINTMENT (OUTPATIENT)
Dept: OBSTETRICS AND GYNECOLOGY | Facility: CLINIC | Age: 34
End: 2024-09-26
Payer: COMMERCIAL

## 2024-10-18 ENCOUNTER — APPOINTMENT (OUTPATIENT)
Dept: OBSTETRICS AND GYNECOLOGY | Facility: CLINIC | Age: 34
End: 2024-10-18
Payer: COMMERCIAL